# Patient Record
Sex: MALE | Race: WHITE | NOT HISPANIC OR LATINO | Employment: UNEMPLOYED | URBAN - METROPOLITAN AREA
[De-identification: names, ages, dates, MRNs, and addresses within clinical notes are randomized per-mention and may not be internally consistent; named-entity substitution may affect disease eponyms.]

---

## 2023-11-11 PROBLEM — R63.5 ABNORMAL WEIGHT GAIN: Status: ACTIVE | Noted: 2023-11-11

## 2023-11-11 PROBLEM — H71.93 CHOLESTEATOMA OF BOTH EARS: Status: ACTIVE | Noted: 2023-11-11

## 2023-11-11 PROBLEM — L83 ACANTHOSIS NIGRICANS: Status: ACTIVE | Noted: 2023-11-11

## 2023-11-11 PROBLEM — E66.9 OBESITY, UNSPECIFIED: Status: ACTIVE | Noted: 2023-11-11

## 2023-11-11 PROBLEM — Z97.3 WEARS GLASSES: Status: ACTIVE | Noted: 2023-11-11

## 2023-11-11 PROBLEM — H90.0 CONDUCTIVE HEARING LOSS, BILATERAL: Status: ACTIVE | Noted: 2023-11-11

## 2023-11-11 PROBLEM — H72.91 PERFORATION OF RIGHT TYMPANIC MEMBRANE: Status: ACTIVE | Noted: 2023-11-11

## 2023-11-11 PROBLEM — S50.02XA LEFT ELBOW CONTUSION: Status: ACTIVE | Noted: 2023-11-11

## 2023-11-11 PROBLEM — H74.21 DISCONTINUITY OF OSSICLES OF RIGHT EAR: Status: ACTIVE | Noted: 2023-11-11
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
## 2023-11-11 PROBLEM — H90.6 MIXED HEARING LOSS, BILATERAL: Status: ACTIVE | Noted: 2023-11-11

## 2023-11-11 PROBLEM — R06.83 SNORING: Status: ACTIVE | Noted: 2023-11-11

## 2023-11-11 PROBLEM — E78.5 DYSLIPIDEMIA: Status: ACTIVE | Noted: 2023-11-11

## 2023-11-11 PROBLEM — E16.1 HYPERINSULINISM: Status: ACTIVE | Noted: 2023-11-11

## 2023-11-11 RX ORDER — NAPROXEN 500 MG/1
1 TABLET ORAL DAILY
COMMUNITY
Start: 2019-02-02 | End: 2023-12-04 | Stop reason: ALTCHOICE

## 2023-11-13 ENCOUNTER — OFFICE VISIT (OUTPATIENT)
Dept: OTOLARYNGOLOGY | Facility: CLINIC | Age: 23
End: 2023-11-13
Payer: COMMERCIAL

## 2023-11-13 VITALS — HEIGHT: 77 IN | WEIGHT: 315 LBS | TEMPERATURE: 96.9 F | BODY MASS INDEX: 37.19 KG/M2

## 2023-11-13 DIAGNOSIS — H90.0 CONDUCTIVE HEARING LOSS, BILATERAL: ICD-10-CM

## 2023-11-13 DIAGNOSIS — H61.22 IMPACTED CERUMEN OF LEFT EAR: ICD-10-CM

## 2023-11-13 DIAGNOSIS — H71.93 CHOLESTEATOMA OF BOTH EARS: Primary | ICD-10-CM

## 2023-11-13 DIAGNOSIS — H95.191 ENCOUNTER FOR DEBRIDEMENT OF RIGHT POSTMASTOIDECTOMY CAVITY: ICD-10-CM

## 2023-11-13 DIAGNOSIS — H90.6 MIXED HEARING LOSS, BILATERAL: ICD-10-CM

## 2023-11-13 DIAGNOSIS — H74.21 DISCONTINUITY OF OSSICLES OF RIGHT EAR: ICD-10-CM

## 2023-11-13 PROBLEM — H72.91 PERFORATION OF RIGHT TYMPANIC MEMBRANE: Status: RESOLVED | Noted: 2023-11-11 | Resolved: 2023-11-13

## 2023-11-13 PROCEDURE — 69220 CLEAN OUT MASTOID CAVITY: CPT | Performed by: OTOLARYNGOLOGY

## 2023-11-13 PROCEDURE — 3008F BODY MASS INDEX DOCD: CPT | Performed by: OTOLARYNGOLOGY

## 2023-11-13 PROCEDURE — 99213 OFFICE O/P EST LOW 20 MIN: CPT | Performed by: OTOLARYNGOLOGY

## 2023-11-13 PROCEDURE — 69210 REMOVE IMPACTED EAR WAX UNI: CPT | Performed by: OTOLARYNGOLOGY

## 2023-11-13 PROCEDURE — 1036F TOBACCO NON-USER: CPT | Performed by: OTOLARYNGOLOGY

## 2023-11-13 ASSESSMENT — PATIENT HEALTH QUESTIONNAIRE - PHQ9
2. FEELING DOWN, DEPRESSED OR HOPELESS: NOT AT ALL
1. LITTLE INTEREST OR PLEASURE IN DOING THINGS: NOT AT ALL
SUM OF ALL RESPONSES TO PHQ9 QUESTIONS 1 AND 2: 0

## 2023-11-13 NOTE — PROGRESS NOTES
Reason for Consult:  Follow-up     Subjective   History Of Present Illness:  Ashkan Collado is a 23 y.o. male with bilateral cholesteatomas. They were both very extensive. On the left side, he had an attic cholesteatoma extending to the mastoid and extending laterally to the cortical bone of the mastoid. The incus was eroded and the stapes was intact. He is s/p left-sided tympanomastoidectomy with removal of cholesteatoma cartilage graft on 02/2023. We did not reconstruct the ossicular chain at that time.     I took him to the OR on 03/2023 and performed a Right modified radical tympanomastoidectomy with meatoplasty and removal of the tip of the mastoid due to erosion of the posterior ear canal     During surgery, we encountered:  1. Large attic cholesteatoma arising from the posterior epitympanum, extending to the anterior epitympanum, mesotympanum, retrotympanum and mastoid. There was complete attic and tensor fold blockage.   2. The incus and staples superstructure was completely eroded and the head of the malleus was surrounded by cholesteatoma. The malleus head were removed. The staples footplate remained present and mobile.   3. No evidence of cholesteatoma in the protympanum or hypotympanum.   4. Tensor fold obstructed.   5. Attic, Anterior and posterior Isthmus obstructed.   6. All the ventilation system was opened.   7. Chorda surrounded in cholesteatoma, sacrificed.   8. Mucosa moderately inflamed.   9. Mastoid, canal wall down performed   10. facial nerve dehiscent at the tympanic segment, but not in the mastoid cavity   11. partial erosion of the lateral canal with no matrix exposure     Since surgery, he has been doing well without any problems.      Past Medical History:  He has a past medical history of Concussion without loss of consciousness, initial encounter (09/28/2015), Obstructive sleep apnea (adult) (pediatric) (01/28/2017), and Personal history of other diseases of the respiratory  "system (11/06/2020).    Surgical History:  He has a past surgical history that includes Tonsillectomy (02/02/2015) and Other surgical history (01/28/2017).     Social History:  He reports that he has quit smoking. His smoking use included pipe. He has never used smokeless tobacco. No history on file for alcohol use and drug use.    Family History:  family history includes Diabetes in an other family member; Hyperlipidemia in his mother's brother and another family member; Obesity in his father, mother's brother, and another family member; Sleep apnea in his father and another family member.     Medications:  Current Outpatient Medications   Medication Instructions    naproxen (Naprosyn) 500 mg tablet 1 tablet, oral, Daily      Allergies:  Patient has no known allergies.    Review of Systems:   A comprehensive 10-point review of systems was obtained including constitutional, neurological, HEENT, pulmonary, cardiovascular, genito-urinary, and other pertinent systems and was negative except as noted in the HPI.     Objective   Physical Exam:  Last Recorded Vitals: Temperature 36.1 °C (96.9 °F), height 1.956 m (6' 5\"), weight (!) 161 kg (354 lb 14.4 oz).    On physical exam, the patient is a well-nourished, well-developed patient, in no acute distress, able to communicate without assistance in English language. Head and face is atraumatic and normocephalic. Salivary glands are intact. Facial strength is symmetrical bilaterally.       On ear examination:  Right ear: The canal wall down cavity looks intact and in good condition. The canal wall down cavity was debrided. The neotympanum is intact and in good condition. There is a mucoid effusion.   BC>AC  Left ear: The patient has cerumen impaction that was removed. The neotympanum is intact and in good condition.  BC>AC  The Saez is midline    On vestibular exam, the patient has no spontaneous nystagmus, no headshake nystagmus, no head-thrust nystagmus, and no nystagmus " on hyperventilation or Valsalva maneuvers. Chris-Hallpike maneuver is negative bilaterally.       On neuro exam, the patient is alert and oriented x3, cranial nerves are grossly intact, cerebellar exam is normal.      The rest of the exam, including anterior rhinoscopy, oropharyngeal exam, neck exam, and cardiovascular exam, were normal including no palpable lymphadenopathies, thyroid in the midline position, normal pulses, and normal chest excursion.       Reviewed Results:  Audiology Testing:  I reviewed his audiogram from 08/2023 that showed a mild left-sided conductive hearing loss and a moderate maximum conductive hearing loss on the right side. He has a 100% discrimination bilaterally.     I reviewed his audiogram from 01/2023 that showed a moderate conductive hearing loss on the left side and moderate to severe conductive hearing loss on the right side. He has 100% discrimination bilaterally.       Imaging:  I reviewed his CT scan from 12/2022 that showed a right-sided cholesteatoma with an erosion of the ear canal, absent incus and stapes. The lateral canal is intact. He has a very anterior sigmoid and a low line tegmen on the right side. On the left side, he has an epitympanic cholesteatoma with erosion of the incus and debris in the mastoid and attic. The cortex is eroded. The stapes seems to be present.      Assessment/Plan   In summary, Ashkan Collado is a 23 y.o. male with bilateral cholesteatomas. They were both very extensive. On the left side, he had an attic cholesteatoma extending to the mastoid and extending laterally to the cortical bone of the mastoid. The incus was eroded and the stapes was intact. He is s/p left-sided tympanomastoidectomy and removal of the cholesteatoma in 02/2023. I did not reconstruct the ossicular chain and the neotympanum is healing appropriately.     On the right side, he had had a extensive cholesteatoma with erosion of the posterior wall of the ear canal as well as a  very low tegmen with anterior sigmoid that required a canal wall down mastoidectomy done in 02/2023.  I did not reconstruct the ossicular chain either. The cavity has epithelized appropriately and it looks to be in perfect condition.    On the left side, the cerumen impaction was removed. There is a little area of exposed bone, but overall the neotympanum looks in good condition.     He is interested in having surgery in the left ear for a second look. The risks, indications, alternatives and complications of doing left-sided endoscopic tympanoplasty with cartilage graft and possible OCR were discussed with the patient. The patient elected to proceed. He is already scheduled for December. I will see him the day of surgery.       Scribe Attestation  By signing my name below, I, Thong Nick   attest that this documentation has been prepared under the direction and in the presence of Yusuf Salgado MD.   ____________________________________________________  Yusuf Candelaria MD  Professor and Chief   Otology/Neurotology/Lateral Skull-Base Surgery   [facility]

## 2023-11-28 ENCOUNTER — TELEMEDICINE CLINICAL SUPPORT (OUTPATIENT)
Dept: PREADMISSION TESTING | Facility: HOSPITAL | Age: 23
End: 2023-11-28
Payer: COMMERCIAL

## 2023-12-04 ENCOUNTER — PRE-ADMISSION TESTING (OUTPATIENT)
Dept: PREADMISSION TESTING | Facility: HOSPITAL | Age: 23
End: 2023-12-04
Payer: COMMERCIAL

## 2023-12-04 VITALS
TEMPERATURE: 98.1 F | HEIGHT: 77 IN | DIASTOLIC BLOOD PRESSURE: 86 MMHG | SYSTOLIC BLOOD PRESSURE: 136 MMHG | HEART RATE: 70 BPM | OXYGEN SATURATION: 98 % | WEIGHT: 315 LBS | BODY MASS INDEX: 37.19 KG/M2

## 2023-12-04 DIAGNOSIS — Z01.818 PREOPERATIVE TESTING: Primary | ICD-10-CM

## 2023-12-04 LAB
ANION GAP SERPL CALC-SCNC: 15 MMOL/L (ref 10–20)
BUN SERPL-MCNC: 10 MG/DL (ref 6–23)
CALCIUM SERPL-MCNC: 9.4 MG/DL (ref 8.6–10.6)
CHLORIDE SERPL-SCNC: 101 MMOL/L (ref 98–107)
CO2 SERPL-SCNC: 27 MMOL/L (ref 21–32)
CREAT SERPL-MCNC: 0.75 MG/DL (ref 0.5–1.3)
ERYTHROCYTE [DISTWIDTH] IN BLOOD BY AUTOMATED COUNT: 11.9 % (ref 11.5–14.5)
GFR SERPL CREATININE-BSD FRML MDRD: >90 ML/MIN/1.73M*2
GLUCOSE SERPL-MCNC: 99 MG/DL (ref 74–99)
HCT VFR BLD AUTO: 47 % (ref 41–52)
HGB BLD-MCNC: 15.6 G/DL (ref 13.5–17.5)
MCH RBC QN AUTO: 29.4 PG (ref 26–34)
MCHC RBC AUTO-ENTMCNC: 33.2 G/DL (ref 32–36)
MCV RBC AUTO: 89 FL (ref 80–100)
NRBC BLD-RTO: 0 /100 WBCS (ref 0–0)
PLATELET # BLD AUTO: 268 X10*3/UL (ref 150–450)
POTASSIUM SERPL-SCNC: 3.9 MMOL/L (ref 3.5–5.3)
RBC # BLD AUTO: 5.31 X10*6/UL (ref 4.5–5.9)
SODIUM SERPL-SCNC: 139 MMOL/L (ref 136–145)
WBC # BLD AUTO: 8.9 X10*3/UL (ref 4.4–11.3)

## 2023-12-04 PROCEDURE — 36415 COLL VENOUS BLD VENIPUNCTURE: CPT

## 2023-12-04 PROCEDURE — 80048 BASIC METABOLIC PNL TOTAL CA: CPT

## 2023-12-04 PROCEDURE — 99214 OFFICE O/P EST MOD 30 MIN: CPT | Performed by: NURSE PRACTITIONER

## 2023-12-04 PROCEDURE — 85027 COMPLETE CBC AUTOMATED: CPT

## 2023-12-04 ASSESSMENT — ENCOUNTER SYMPTOMS
NECK NEGATIVE: 1
RESPIRATORY NEGATIVE: 1
CARDIOVASCULAR NEGATIVE: 1
CONSTITUTIONAL NEGATIVE: 1
ENDOCRINE NEGATIVE: 1
NEUROLOGICAL NEGATIVE: 1
EYES NEGATIVE: 1
MUSCULOSKELETAL NEGATIVE: 1
GASTROINTESTINAL NEGATIVE: 1

## 2023-12-04 ASSESSMENT — CHADS2 SCORE
CHF: NO
AGE GREATER THAN OR EQUAL TO 75: NO
DIABETES: NO
CHADS2 SCORE: 0
HYPERTENSION: NO
PRIOR STROKE OR TIA OR THROMBOEMBOLISM: NO

## 2023-12-04 ASSESSMENT — DUKE ACTIVITY SCORE INDEX (DASI)
DASI METS SCORE: 9.9
CAN YOU WALK INDOORS, SUCH AS AROUND YOUR HOUSE: YES
TOTAL_SCORE: 58.2
CAN YOU CLIMB A FLIGHT OF STAIRS OR WALK UP A HILL: YES
CAN YOU TAKE CARE OF YOURSELF (EAT, DRESS, BATHE, OR USE TOILET): YES
CAN YOU DO HEAVY WORK AROUND THE HOUSE LIKE SCRUBBING FLOORS OR LIFTING AND MOVING HEAVY FURNITURE: YES
CAN YOU PARTICIPATE IN MODERATE RECREATIONAL ACTIVITIES LIKE GOLF, BOWLING, DANCING, DOUBLES TENNIS OR THROWING A BASEBALL OR FOOTBALL: YES
CAN YOU HAVE SEXUAL RELATIONS: YES
CAN YOU DO MODERATE WORK AROUND THE HOUSE LIKE VACUUMING, SWEEPING FLOORS OR CARRYING GROCERIES: YES
CAN YOU RUN A SHORT DISTANCE: YES
CAN YOU DO LIGHT WORK AROUND THE HOUSE LIKE DUSTING OR WASHING DISHES: YES
CAN YOU DO YARD WORK LIKE RAKING LEAVES, WEEDING OR PUSHING A MOWER: YES
CAN YOU WALK A BLOCK OR TWO ON LEVEL GROUND: YES
CAN YOU PARTICIPATE IN STRENOUS SPORTS LIKE SWIMMING, SINGLES TENNIS, FOOTBALL, BASKETBALL, OR SKIING: YES

## 2023-12-04 ASSESSMENT — LIFESTYLE VARIABLES: SMOKING_STATUS: SMOKER

## 2023-12-04 NOTE — PREPROCEDURE INSTRUCTIONS
NPO Instructions:    Do not eat any food after midnight the night before your surgery/procedure.  You may have up to TEN ounces of clear liquids until TWO hours before your instructed arrival time to the hospital. This includes water, black tea/coffee, (no milk or cream), apple juice, and/or electrolyte drinks (Gatorade).  You may chew gum up to TWO hours before your surgery/procedure.    Additional Instructions:     Avoid herbal supplements, multivitamins and NSAIDS (non-steroidal anti-inflammatory drugs) such as Advil, Aleve, Ibuprofen, Naproxen, Excedrin, Meloxicam or Celebrex for at least 7 days prior to surgery. May take Tylenol as needed.    Seven/Six Days before Surgery:  Review your medication instructions, stop indicated medications    Day of Surgery:  Review your medication instructions, take indicated medications  Wear comfortable loose fitting clothing  Do not use moisturizers, creams, lotions or perfume  All jewelry and valuables should be left at home    [facility] Corrales [facility]  [facility]  [phone]  [phone]  [email or web address]

## 2023-12-04 NOTE — CPM/PAT H&P
CPM/PAT Evaluation       Name: Ashkan Collado (Ashkan Collado)  /Age:  y.o.     Visit Type:   In-Person       Chief Complaint: cholesteatoma, hearing loss    HPI  The patient is a 23 year old  male with history of bilateral cholesteatomas. He is s/p left-sided tympanomastoidectomy with removal of cholesteatoma cartilage graft on 2023 and right modified radical tympanomastoidectomy with meatoplasty and removal of the tip of the mastoid due to erosion of the posterior ear canal 3/2023. He presents today for perioperative evaluation in anticipation of left sided endoscopic tympanoplasty antrotomy, OCR, and cartilage graft on 23 with Dr. Bari Salgado.       Past Medical History:   Diagnosis Date    ADHD (attention deficit hyperactivity disorder)     Anxiety     HL (hearing loss)     ENT: Yusuf Salgado    Obstructive sleep apnea (adult) (pediatric) 2017    Obstructive sleep apnea    Perforation of right tympanic membrane 2023       Past Surgical History:   Procedure Laterality Date    OTHER SURGICAL HISTORY  2017    Cholesteatoma Surgery    TONSILLECTOMY  2015    Tonsillectomy With Adenoidectomy    TYMPANOPLASTY  2023    With mastoidectomy       Patient Sexual activity questions deferred to the physician.    Family History   Problem Relation Name Age of Onset    Obesity Father      Sleep apnea Father      Hyperlipidemia Mother's Brother      Obesity Mother's Brother      Sleep apnea Other family Hx     Obesity Other family Hx     Hyperlipidemia Other family Hx     Diabetes Other family Hx        No Known Allergies    Prior to Admission medications    Medication Sig Start Date End Date Taking? Authorizing Provider   naproxen (Naprosyn) 500 mg tablet Take 1 tablet (500 mg) by mouth early in the morning.. 19  Historical Provider, MD VILLAGRAN ROS:   Constitutional:   neg    Neuro/Psych:   neg    Eyes:   neg    Ears:    hearing loss  Nose:    neg    Mouth:   neg    Throat:   neg    Neck:   neg    Cardio:   neg    Respiratory:   neg    Endocrine:   neg    GI:   neg    :   neg    Musculoskeletal:   neg    Hematologic:   neg    Skin:  neg        Physical Exam  Vitals reviewed.   Constitutional:       Appearance: Normal appearance.   HENT:      Head: Normocephalic and atraumatic.      Nose: Nose normal.      Mouth/Throat:      Mouth: Mucous membranes are moist.      Pharynx: Oropharynx is clear.   Eyes:      Extraocular Movements: Extraocular movements intact.      Pupils: Pupils are equal, round, and reactive to light.   Cardiovascular:      Rate and Rhythm: Normal rate and regular rhythm.      Pulses: Normal pulses.      Heart sounds: Normal heart sounds.   Pulmonary:      Effort: Pulmonary effort is normal.      Breath sounds: Normal breath sounds.   Musculoskeletal:         General: Normal range of motion.      Cervical back: Normal range of motion.   Skin:     General: Skin is warm and dry.   Neurological:      General: No focal deficit present.      Mental Status: He is alert and oriented to person, place, and time.   Psychiatric:         Mood and Affect: Mood normal.         Behavior: Behavior normal.          PAT AIRWAY:   Airway:     Mallampati::  II    TM distance::  >3 FB    Neck ROM::  Full  normal        Visit Vitals  /86   Pulse 70   Temp 36.7 °C (98.1 °F) (Oral)       DASI Risk Score      Flowsheet Row Most Recent Value   DASI SCORE 58.2   METS Score (Will be calculated only when all the questions are answered) 9.9          Caprini DVT Assessment      Flowsheet Row Most Recent Value   DVT Score 7   Current Status Major surgery planned, lasting 2-3 hours   History Prior major surgery   Age Less than 40 years   BMI 41-50 (Morbid obesity)          Modified Frailty Index      Flowsheet Row Most Recent Value   Modified Frailty Index Calculator 0          CHADS2 Stroke Risk         N/A 3 - 100%: High Risk   2 - 3%: Medium Risk   0 - 2%:  Low Risk     Last Change: N/A          This score determines the patient's risk of having a stroke if the patient has atrial fibrillation.        This score is not applicable to this patient. Components are not calculated.          Revised Cardiac Risk Index      Flowsheet Row Most Recent Value   Revised Cardiac Risk Calculator 0          Apfel Simplified Score      Flowsheet Row Most Recent Value   Apfel Simplified Score Calculator 1          Risk Analysis Index Results This Encounter    No data found in the last 1 encounters.       Stop Bang Score      Flowsheet Row Most Recent Value   Do you snore loudly? 1   Do you often feel tired or fatigued after your sleep? 0   Has anyone ever observed you stop breathing in your sleep? 0   Do you have or are you being treated for high blood pressure? 0   Recent BMI (Calculated) 42.1   Is BMI greater than 35 kg/m2? 1=Yes   Age older than 50 years old? 0=No   Is your neck circumference greater than 17 inches (Male) or 16 inches (Female)? 1   Gender - Male 1=Yes   STOP-BANG Total Score 4          Recent Results (from the past 336 hour(s))   CBC    Collection Time: 12/04/23  2:11 PM   Result Value Ref Range    WBC 8.9 4.4 - 11.3 x10*3/uL    nRBC 0.0 0.0 - 0.0 /100 WBCs    RBC 5.31 4.50 - 5.90 x10*6/uL    Hemoglobin 15.6 13.5 - 17.5 g/dL    Hematocrit 47.0 41.0 - 52.0 %    MCV 89 80 - 100 fL    MCH 29.4 26.0 - 34.0 pg    MCHC 33.2 32.0 - 36.0 g/dL    RDW 11.9 11.5 - 14.5 %    Platelets 268 150 - 450 x10*3/uL   Basic Metabolic Panel    Collection Time: 12/04/23  2:11 PM   Result Value Ref Range    Glucose 99 74 - 99 mg/dL    Sodium 139 136 - 145 mmol/L    Potassium 3.9 3.5 - 5.3 mmol/L    Chloride 101 98 - 107 mmol/L    Bicarbonate 27 21 - 32 mmol/L    Anion Gap 15 10 - 20 mmol/L    Urea Nitrogen 10 6 - 23 mg/dL    Creatinine 0.75 0.50 - 1.30 mg/dL    eGFR >90 >60 mL/min/1.73m*2    Calcium 9.4 8.6 - 10.6 mg/dL          Assessment and Plan:     Neuro:   The patient has no  neurological diagnoses or significant findings on chart review, clinical presentation, and evaluation.  No grossly apparent perioperative risk. The patient is at increased risk for perioperative stroke secondary to general anesthesia, operative time >2.5 hours. Handouts for preoperative brain exercises given to patient.    HEENT/Airway  The patient has diagnoses, significant findings on chart review, clinical presentation or evaluation of obesity, short thick neck, No documented or reported history of airway difficulty.     Cardiovascular  The patient is scheduled for non-cardiac surgery associated with elevated risk.  The patient has no major cardiac contraindications to non- cardiac surgery.  RCRI  The patient meets 0-1 RCRI criteria and therefore has a less than 1% risk of major adverse cardiac complications.  METS  The patient's functional capacity capacity is greater than 4 METS.  EKG  The patient has no EKG or echocardiographic changes concerning for myocardial ischemia.  No further cardiac evaluation is indicated  Heart Failure  The patient has no known history of heart failure.  Additionally, the patient reports no symptoms of heart failure and demonstrates no signs of heart failure.  Hypertension Evaluation  The patient has no known history of hypertension and has a normal blood pressure today.  Heart Rhythm Evaluation  The patient has no history of arrhythmias.  Heart Valve Evaluation  The patient has no known history of valvular heart disease. The patient has no symptoms or physical exam findings to suggest valvular heart disease.  CARDS EVAL  The patient is not followed by cardiology.  The patient has a 30-day risk for MACE of 0 predictors, 3.9% risk for cardiac death, nonfatal myocardial infarction, and nonfatal cardiac arrest.  MAR score which indicates a 0% risk of intraoperative or 30-day postoperative.    Pulmonary   No significant findings on chart review or clinical presentation and evaluation.  The patient is at increased risk of perioperative pulmonary complications secondary to morbid obesity.  The patient has a stop bang score of 4, which places patient at intermediate risk for having SURESH.  ARISCAT 16, low, 1.6% risk of in-hospital postoperative pulmonary complications  PRODIGY 11, intermediate of respiratory depression episode.    Hematology  No diagnoses or significant findings on chart review or clinical presentation and evaluation.  Antiplatelet management   The patient is not currently receiving antiplatelet therapy.  Anticoagulation management  The patient is not currently receiving anticoagulation therapy. Patient provided with DVT educational handout.    Caprini score 7, high risk of perioperative VTE    GI  No diagnoses or significant findings on chart review or clinical presentation and evaluation.  Eat 10- 0,  self-perceived oropharyngeal dysphagia scale (0-40)     Genitourinary  No diagnoses or significant findings on chart review or clinical presentation and evaluation.    Renal  The patient has no known history of chronic kidney disease. No renal diagnoses or significant findings on chart review or clinical presentation and evaluation. The patient has specific risk factors associated with increased risk of perioperative renal complications due to male gender. Preventative measures include preoperative hydration.    Musculoskeletal  No diagnoses or significant findings on chart review or clinical presentation and evaluation.    Endocrine  Diabetes Evaluation  The patient has no history of diabetes mellitus  Thyroid Disease Evaluation  The patient has no history of thyroid disease.    ID  No diagnoses or significant findings on chart review or clinical presentation and evaluation.    -Preoperative medication instructions were provided and reviewed with the patient.  Any additional testing or evaluation was explained to the patient.  NPO Instructions were discussed, and the patient's  questions were answered prior to conclusion of this encounter

## 2023-12-16 ENCOUNTER — ANESTHESIA EVENT (OUTPATIENT)
Dept: OPERATING ROOM | Facility: HOSPITAL | Age: 23
End: 2023-12-16
Payer: COMMERCIAL

## 2023-12-18 ENCOUNTER — ANESTHESIA (OUTPATIENT)
Dept: OPERATING ROOM | Facility: HOSPITAL | Age: 23
End: 2023-12-18
Payer: COMMERCIAL

## 2023-12-18 ENCOUNTER — HOSPITAL ENCOUNTER (OUTPATIENT)
Facility: HOSPITAL | Age: 23
Setting detail: OUTPATIENT SURGERY
Discharge: HOME | End: 2023-12-18
Attending: OTOLARYNGOLOGY | Admitting: OTOLARYNGOLOGY
Payer: COMMERCIAL

## 2023-12-18 VITALS
DIASTOLIC BLOOD PRESSURE: 74 MMHG | OXYGEN SATURATION: 97 % | RESPIRATION RATE: 18 BRPM | WEIGHT: 315 LBS | TEMPERATURE: 98.1 F | HEIGHT: 76 IN | HEART RATE: 73 BPM | SYSTOLIC BLOOD PRESSURE: 121 MMHG | BODY MASS INDEX: 38.36 KG/M2

## 2023-12-18 DIAGNOSIS — G89.18 POST-OPERATIVE PAIN: ICD-10-CM

## 2023-12-18 DIAGNOSIS — H90.0 CONDUCTIVE HEARING LOSS, BILATERAL: ICD-10-CM

## 2023-12-18 DIAGNOSIS — H71.93 CHOLESTEATOMA OF BOTH EARS: Primary | ICD-10-CM

## 2023-12-18 PROBLEM — G47.33 OSA (OBSTRUCTIVE SLEEP APNEA): Status: ACTIVE | Noted: 2023-12-18

## 2023-12-18 PROCEDURE — 2500000005 HC RX 250 GENERAL PHARMACY W/O HCPCS

## 2023-12-18 PROCEDURE — 3700000001 HC GENERAL ANESTHESIA TIME - INITIAL BASE CHARGE: Performed by: OTOLARYNGOLOGY

## 2023-12-18 PROCEDURE — 2780000003 HC OR 278 NO HCPCS: Performed by: OTOLARYNGOLOGY

## 2023-12-18 PROCEDURE — 3600000007 HC OR TIME - EACH INCREMENTAL 1 MINUTE - PROCEDURE LEVEL TWO: Performed by: OTOLARYNGOLOGY

## 2023-12-18 PROCEDURE — 96372 THER/PROPH/DIAG INJ SC/IM: CPT | Performed by: OTOLARYNGOLOGY

## 2023-12-18 PROCEDURE — 7100000010 HC PHASE TWO TIME - EACH INCREMENTAL 1 MINUTE: Performed by: OTOLARYNGOLOGY

## 2023-12-18 PROCEDURE — 2500000002 HC RX 250 W HCPCS SELF ADMINISTERED DRUGS (ALT 637 FOR MEDICARE OP, ALT 636 FOR OP/ED)

## 2023-12-18 PROCEDURE — 2720000007 HC OR 272 NO HCPCS: Performed by: OTOLARYNGOLOGY

## 2023-12-18 PROCEDURE — 3600000002 HC OR TIME - INITIAL BASE CHARGE - PROCEDURE LEVEL TWO: Performed by: OTOLARYNGOLOGY

## 2023-12-18 PROCEDURE — 7100000001 HC RECOVERY ROOM TIME - INITIAL BASE CHARGE: Performed by: OTOLARYNGOLOGY

## 2023-12-18 PROCEDURE — 2500000001 HC RX 250 WO HCPCS SELF ADMINISTERED DRUGS (ALT 637 FOR MEDICARE OP): Performed by: OTOLARYNGOLOGY

## 2023-12-18 PROCEDURE — 3700000002 HC GENERAL ANESTHESIA TIME - EACH INCREMENTAL 1 MINUTE: Performed by: OTOLARYNGOLOGY

## 2023-12-18 PROCEDURE — A69633 PR TYMPANOPLASTY,REBLD OSSIC CHAIN+PROS: Performed by: ANESTHESIOLOGY

## 2023-12-18 PROCEDURE — 95867 NDL EMG CRANIAL NRV MUSC UNI: CPT | Performed by: OTOLARYNGOLOGY

## 2023-12-18 PROCEDURE — 2500000004 HC RX 250 GENERAL PHARMACY W/ HCPCS (ALT 636 FOR OP/ED): Performed by: ANESTHESIOLOGY

## 2023-12-18 PROCEDURE — 36620 INSERTION CATHETER ARTERY: CPT

## 2023-12-18 PROCEDURE — 7100000002 HC RECOVERY ROOM TIME - EACH INCREMENTAL 1 MINUTE: Performed by: OTOLARYNGOLOGY

## 2023-12-18 PROCEDURE — 7100000009 HC PHASE TWO TIME - INITIAL BASE CHARGE: Performed by: OTOLARYNGOLOGY

## 2023-12-18 PROCEDURE — 2500000005 HC RX 250 GENERAL PHARMACY W/O HCPCS: Performed by: OTOLARYNGOLOGY

## 2023-12-18 PROCEDURE — 2500000004 HC RX 250 GENERAL PHARMACY W/ HCPCS (ALT 636 FOR OP/ED)

## 2023-12-18 PROCEDURE — 69633 REBUILD EARDRUM STRUCTURES: CPT | Performed by: OTOLARYNGOLOGY

## 2023-12-18 PROCEDURE — 2500000002 HC RX 250 W HCPCS SELF ADMINISTERED DRUGS (ALT 637 FOR MEDICARE OP, ALT 636 FOR OP/ED): Performed by: OTOLARYNGOLOGY

## 2023-12-18 PROCEDURE — A4217 STERILE WATER/SALINE, 500 ML: HCPCS | Performed by: OTOLARYNGOLOGY

## 2023-12-18 PROCEDURE — 2500000004 HC RX 250 GENERAL PHARMACY W/ HCPCS (ALT 636 FOR OP/ED): Performed by: OTOLARYNGOLOGY

## 2023-12-18 DEVICE — CENTERED ALTO CONCISE PARTIAL SIZERS INCLUDED TITANIUM/SILICONE
Type: IMPLANTABLE DEVICE | Site: EAR | Status: FUNCTIONAL
Brand: CENTERED ALTO CONCISE PARTIAL

## 2023-12-18 RX ORDER — LIDOCAINE HYDROCHLORIDE AND EPINEPHRINE 10; 10 MG/ML; UG/ML
INJECTION, SOLUTION INFILTRATION; PERINEURAL AS NEEDED
Status: DISCONTINUED | OUTPATIENT
Start: 2023-12-18 | End: 2023-12-18 | Stop reason: HOSPADM

## 2023-12-18 RX ORDER — CEPHALEXIN 500 MG/1
500 CAPSULE ORAL 3 TIMES DAILY
Qty: 21 CAPSULE | Refills: 0 | Status: SHIPPED | OUTPATIENT
Start: 2023-12-18 | End: 2023-12-25

## 2023-12-18 RX ORDER — EPINEPHRINE 1 MG/ML
INJECTION, SOLUTION, CONCENTRATE INTRAVENOUS AS NEEDED
Status: DISCONTINUED | OUTPATIENT
Start: 2023-12-18 | End: 2023-12-18 | Stop reason: HOSPADM

## 2023-12-18 RX ORDER — ACETAMINOPHEN 325 MG/1
650 TABLET ORAL EVERY 4 HOURS PRN
Status: DISCONTINUED | OUTPATIENT
Start: 2023-12-18 | End: 2023-12-18 | Stop reason: HOSPADM

## 2023-12-18 RX ORDER — REMIFENTANIL HYDROCHLORIDE 1 MG/ML
INJECTION, POWDER, LYOPHILIZED, FOR SOLUTION INTRAVENOUS AS NEEDED
Status: DISCONTINUED | OUTPATIENT
Start: 2023-12-18 | End: 2023-12-18

## 2023-12-18 RX ORDER — CIPROFLOXACIN AND DEXAMETHASONE 3; 1 MG/ML; MG/ML
SUSPENSION/ DROPS AURICULAR (OTIC) AS NEEDED
Status: DISCONTINUED | OUTPATIENT
Start: 2023-12-18 | End: 2023-12-18 | Stop reason: HOSPADM

## 2023-12-18 RX ORDER — ROCURONIUM BROMIDE 10 MG/ML
INJECTION, SOLUTION INTRAVENOUS AS NEEDED
Status: DISCONTINUED | OUTPATIENT
Start: 2023-12-18 | End: 2023-12-18

## 2023-12-18 RX ORDER — WATER 1 ML/ML
IRRIGANT IRRIGATION AS NEEDED
Status: DISCONTINUED | OUTPATIENT
Start: 2023-12-18 | End: 2023-12-18 | Stop reason: HOSPADM

## 2023-12-18 RX ORDER — MIDAZOLAM HYDROCHLORIDE 1 MG/ML
INJECTION, SOLUTION INTRAMUSCULAR; INTRAVENOUS AS NEEDED
Status: DISCONTINUED | OUTPATIENT
Start: 2023-12-18 | End: 2023-12-18

## 2023-12-18 RX ORDER — LIDOCAINE HYDROCHLORIDE 20 MG/ML
INJECTION, SOLUTION INFILTRATION; PERINEURAL AS NEEDED
Status: DISCONTINUED | OUTPATIENT
Start: 2023-12-18 | End: 2023-12-18

## 2023-12-18 RX ORDER — DEXAMETHASONE SODIUM PHOSPHATE 100 MG/10ML
INJECTION INTRAMUSCULAR; INTRAVENOUS AS NEEDED
Status: DISCONTINUED | OUTPATIENT
Start: 2023-12-18 | End: 2023-12-18

## 2023-12-18 RX ORDER — ONDANSETRON HYDROCHLORIDE 2 MG/ML
INJECTION, SOLUTION INTRAVENOUS AS NEEDED
Status: DISCONTINUED | OUTPATIENT
Start: 2023-12-18 | End: 2023-12-18

## 2023-12-18 RX ORDER — MIDAZOLAM HYDROCHLORIDE 1 MG/ML
INJECTION INTRAMUSCULAR; INTRAVENOUS CONTINUOUS PRN
Status: DISCONTINUED | OUTPATIENT
Start: 2023-12-18 | End: 2023-12-18

## 2023-12-18 RX ORDER — ONDANSETRON HYDROCHLORIDE 2 MG/ML
4 INJECTION, SOLUTION INTRAVENOUS ONCE AS NEEDED
Status: DISCONTINUED | OUTPATIENT
Start: 2023-12-18 | End: 2023-12-18 | Stop reason: HOSPADM

## 2023-12-18 RX ORDER — CIPROFLOXACIN AND DEXAMETHASONE 3; 1 MG/ML; MG/ML
3 SUSPENSION/ DROPS AURICULAR (OTIC) 2 TIMES DAILY
Qty: 7.5 ML | Refills: 0 | Status: SHIPPED | OUTPATIENT
Start: 2023-12-18 | End: 2023-12-25

## 2023-12-18 RX ORDER — SCOLOPAMINE TRANSDERMAL SYSTEM 1 MG/1
PATCH, EXTENDED RELEASE TRANSDERMAL AS NEEDED
Status: DISCONTINUED | OUTPATIENT
Start: 2023-12-18 | End: 2023-12-18

## 2023-12-18 RX ORDER — NORETHINDRONE AND ETHINYL ESTRADIOL 0.5-0.035
KIT ORAL AS NEEDED
Status: DISCONTINUED | OUTPATIENT
Start: 2023-12-18 | End: 2023-12-18

## 2023-12-18 RX ORDER — POLYETHYLENE GLYCOL 3350 17 G/17G
17 POWDER, FOR SOLUTION ORAL DAILY
Qty: 5 PACKET | Refills: 0 | Status: SHIPPED | OUTPATIENT
Start: 2023-12-18 | End: 2023-12-23

## 2023-12-18 RX ORDER — GLYCOPYRROLATE 0.2 MG/ML
INJECTION INTRAMUSCULAR; INTRAVENOUS AS NEEDED
Status: DISCONTINUED | OUTPATIENT
Start: 2023-12-18 | End: 2023-12-18

## 2023-12-18 RX ORDER — ACETAMINOPHEN 325 MG/1
TABLET ORAL AS NEEDED
Status: DISCONTINUED | OUTPATIENT
Start: 2023-12-18 | End: 2023-12-18

## 2023-12-18 RX ORDER — SODIUM CHLORIDE, SODIUM LACTATE, POTASSIUM CHLORIDE, CALCIUM CHLORIDE 600; 310; 30; 20 MG/100ML; MG/100ML; MG/100ML; MG/100ML
INJECTION, SOLUTION INTRAVENOUS CONTINUOUS PRN
Status: DISCONTINUED | OUTPATIENT
Start: 2023-12-18 | End: 2023-12-18

## 2023-12-18 RX ORDER — HYDROMORPHONE HYDROCHLORIDE 1 MG/ML
INJECTION, SOLUTION INTRAMUSCULAR; INTRAVENOUS; SUBCUTANEOUS CONTINUOUS PRN
Status: DISCONTINUED | OUTPATIENT
Start: 2023-12-18 | End: 2023-12-18

## 2023-12-18 RX ORDER — HYDROMORPHONE HYDROCHLORIDE 1 MG/ML
0.2 INJECTION, SOLUTION INTRAMUSCULAR; INTRAVENOUS; SUBCUTANEOUS EVERY 5 MIN PRN
Status: DISCONTINUED | OUTPATIENT
Start: 2023-12-18 | End: 2023-12-18 | Stop reason: HOSPADM

## 2023-12-18 RX ORDER — OXYCODONE AND ACETAMINOPHEN 5; 325 MG/1; MG/1
1 TABLET ORAL EVERY 6 HOURS PRN
Qty: 8 TABLET | Refills: 0 | Status: SHIPPED | OUTPATIENT
Start: 2023-12-18

## 2023-12-18 RX ORDER — OXYCODONE HYDROCHLORIDE 5 MG/1
5 TABLET ORAL EVERY 4 HOURS PRN
Status: DISCONTINUED | OUTPATIENT
Start: 2023-12-18 | End: 2023-12-18 | Stop reason: HOSPADM

## 2023-12-18 RX ORDER — PHENYLEPHRINE 10 MG/250 ML(40 MCG/ML)IN 0.9 % SOD.CHLORIDE INTRAVENOUS
CONTINUOUS PRN
Status: DISCONTINUED | OUTPATIENT
Start: 2023-12-18 | End: 2023-12-18

## 2023-12-18 RX ORDER — CEFAZOLIN 1 G/1
INJECTION, POWDER, FOR SOLUTION INTRAVENOUS AS NEEDED
Status: DISCONTINUED | OUTPATIENT
Start: 2023-12-18 | End: 2023-12-18

## 2023-12-18 RX ORDER — MUPIROCIN 20 MG/G
OINTMENT TOPICAL AS NEEDED
Status: DISCONTINUED | OUTPATIENT
Start: 2023-12-18 | End: 2023-12-18 | Stop reason: HOSPADM

## 2023-12-18 RX ORDER — ESMOLOL HYDROCHLORIDE 10 MG/ML
INJECTION INTRAVENOUS AS NEEDED
Status: DISCONTINUED | OUTPATIENT
Start: 2023-12-18 | End: 2023-12-18

## 2023-12-18 RX ORDER — APREPITANT 40 MG/1
CAPSULE ORAL AS NEEDED
Status: DISCONTINUED | OUTPATIENT
Start: 2023-12-18 | End: 2023-12-18

## 2023-12-18 RX ORDER — HYDROMORPHONE HYDROCHLORIDE 1 MG/ML
0.5 INJECTION, SOLUTION INTRAMUSCULAR; INTRAVENOUS; SUBCUTANEOUS EVERY 5 MIN PRN
Status: DISCONTINUED | OUTPATIENT
Start: 2023-12-18 | End: 2023-12-18 | Stop reason: HOSPADM

## 2023-12-18 RX ORDER — SODIUM CHLORIDE, SODIUM LACTATE, POTASSIUM CHLORIDE, CALCIUM CHLORIDE 600; 310; 30; 20 MG/100ML; MG/100ML; MG/100ML; MG/100ML
50 INJECTION, SOLUTION INTRAVENOUS CONTINUOUS
Status: DISCONTINUED | OUTPATIENT
Start: 2023-12-18 | End: 2023-12-18 | Stop reason: HOSPADM

## 2023-12-18 RX ORDER — PROPOFOL 10 MG/ML
INJECTION, EMULSION INTRAVENOUS AS NEEDED
Status: DISCONTINUED | OUTPATIENT
Start: 2023-12-18 | End: 2023-12-18

## 2023-12-18 RX ORDER — SODIUM CHLORIDE 0.9 G/100ML
IRRIGANT IRRIGATION AS NEEDED
Status: DISCONTINUED | OUTPATIENT
Start: 2023-12-18 | End: 2023-12-18 | Stop reason: HOSPADM

## 2023-12-18 RX ADMIN — EPHEDRINE SULFATE 5 MG: 50 INJECTION, SOLUTION INTRAVENOUS at 08:20

## 2023-12-18 RX ADMIN — REMIFENTANIL HYDROCHLORIDE 0.15 MCG/KG/MIN: 1 INJECTION, POWDER, LYOPHILIZED, FOR SOLUTION INTRAVENOUS at 08:46

## 2023-12-18 RX ADMIN — REMIFENTANIL HYDROCHLORIDE 40 MCG: 1 INJECTION, POWDER, LYOPHILIZED, FOR SOLUTION INTRAVENOUS at 08:28

## 2023-12-18 RX ADMIN — REMIFENTANIL HYDROCHLORIDE 20 MCG: 1 INJECTION, POWDER, LYOPHILIZED, FOR SOLUTION INTRAVENOUS at 08:38

## 2023-12-18 RX ADMIN — REMIFENTANIL HYDROCHLORIDE 0.1 MCG/KG/MIN: 1 INJECTION, POWDER, LYOPHILIZED, FOR SOLUTION INTRAVENOUS at 08:21

## 2023-12-18 RX ADMIN — REMIFENTANIL HYDROCHLORIDE 40 MCG: 1 INJECTION, POWDER, LYOPHILIZED, FOR SOLUTION INTRAVENOUS at 08:47

## 2023-12-18 RX ADMIN — Medication 0.2 MCG/KG/MIN: at 08:03

## 2023-12-18 RX ADMIN — SCOPALAMINE 1 PATCH: 1 PATCH, EXTENDED RELEASE TRANSDERMAL at 06:52

## 2023-12-18 RX ADMIN — REMIFENTANIL HYDROCHLORIDE 40 MCG: 1 INJECTION, POWDER, LYOPHILIZED, FOR SOLUTION INTRAVENOUS at 09:25

## 2023-12-18 RX ADMIN — ACETAMINOPHEN 975 MG: 325 TABLET ORAL at 06:52

## 2023-12-18 RX ADMIN — REMIFENTANIL HYDROCHLORIDE 60 MCG: 1 INJECTION, POWDER, LYOPHILIZED, FOR SOLUTION INTRAVENOUS at 08:45

## 2023-12-18 RX ADMIN — REMIFENTANIL HYDROCHLORIDE 40 MCG: 1 INJECTION, POWDER, LYOPHILIZED, FOR SOLUTION INTRAVENOUS at 07:37

## 2023-12-18 RX ADMIN — ESMOLOL HYDROCHLORIDE 100 MG: 10 INJECTION, SOLUTION INTRAVENOUS at 08:11

## 2023-12-18 RX ADMIN — GLYCOPYRROLATE 0.2 MG: 0.2 INJECTION INTRAMUSCULAR; INTRAVENOUS at 08:02

## 2023-12-18 RX ADMIN — REMIFENTANIL HYDROCHLORIDE 40 MCG: 1 INJECTION, POWDER, LYOPHILIZED, FOR SOLUTION INTRAVENOUS at 08:23

## 2023-12-18 RX ADMIN — LIDOCAINE HYDROCHLORIDE 100 MG: 20 INJECTION, SOLUTION INFILTRATION; PERINEURAL at 07:37

## 2023-12-18 RX ADMIN — SUGAMMADEX 200 MG: 100 INJECTION, SOLUTION INTRAVENOUS at 10:24

## 2023-12-18 RX ADMIN — REMIFENTANIL HYDROCHLORIDE 40 MCG: 1 INJECTION, POWDER, LYOPHILIZED, FOR SOLUTION INTRAVENOUS at 09:19

## 2023-12-18 RX ADMIN — SODIUM CHLORIDE, POTASSIUM CHLORIDE, SODIUM LACTATE AND CALCIUM CHLORIDE 50 ML/HR: 600; 310; 30; 20 INJECTION, SOLUTION INTRAVENOUS at 10:45

## 2023-12-18 RX ADMIN — CEFAZOLIN 3 G: 330 INJECTION, POWDER, FOR SOLUTION INTRAMUSCULAR; INTRAVENOUS at 07:47

## 2023-12-18 RX ADMIN — EPHEDRINE SULFATE 5 MG: 50 INJECTION, SOLUTION INTRAVENOUS at 08:33

## 2023-12-18 RX ADMIN — PROPOFOL 200 MG: 10 INJECTION, EMULSION INTRAVENOUS at 07:37

## 2023-12-18 RX ADMIN — REMIFENTANIL HYDROCHLORIDE 60 MCG: 1 INJECTION, POWDER, LYOPHILIZED, FOR SOLUTION INTRAVENOUS at 09:27

## 2023-12-18 RX ADMIN — MIDAZOLAM 2 MG: 1 INJECTION INTRAMUSCULAR; INTRAVENOUS at 07:30

## 2023-12-18 RX ADMIN — DEXAMETHASONE SODIUM PHOSPHATE 10 MG: 10 INJECTION INTRAMUSCULAR; INTRAVENOUS at 08:06

## 2023-12-18 RX ADMIN — ROCURONIUM BROMIDE 50 MG: 10 INJECTION INTRAVENOUS at 07:38

## 2023-12-18 RX ADMIN — REMIFENTANIL HYDROCHLORIDE 20 MCG: 1 INJECTION, POWDER, LYOPHILIZED, FOR SOLUTION INTRAVENOUS at 08:35

## 2023-12-18 RX ADMIN — APREPITANT 40 MG: 40 CAPSULE ORAL at 06:52

## 2023-12-18 RX ADMIN — EPHEDRINE SULFATE 5 MG: 50 INJECTION, SOLUTION INTRAVENOUS at 08:28

## 2023-12-18 RX ADMIN — SODIUM CHLORIDE, POTASSIUM CHLORIDE, SODIUM LACTATE AND CALCIUM CHLORIDE: 600; 310; 30; 20 INJECTION, SOLUTION INTRAVENOUS at 06:52

## 2023-12-18 RX ADMIN — ONDANSETRON 4 MG: 2 INJECTION INTRAMUSCULAR; INTRAVENOUS at 10:13

## 2023-12-18 RX ADMIN — EPHEDRINE SULFATE 5 MG: 50 INJECTION, SOLUTION INTRAVENOUS at 08:13

## 2023-12-18 SDOH — HEALTH STABILITY: MENTAL HEALTH: CURRENT SMOKER: 1

## 2023-12-18 ASSESSMENT — PAIN SCALES - GENERAL
PAINLEVEL_OUTOF10: 0 - NO PAIN
PAIN_LEVEL: 0
PAINLEVEL_OUTOF10: 0 - NO PAIN
PAINLEVEL_OUTOF10: 3
PAINLEVEL_OUTOF10: 0 - NO PAIN
PAINLEVEL_OUTOF10: 3

## 2023-12-18 ASSESSMENT — PAIN - FUNCTIONAL ASSESSMENT
PAIN_FUNCTIONAL_ASSESSMENT: 0-10

## 2023-12-18 NOTE — ANESTHESIA PREPROCEDURE EVALUATION
Patient: Ashkna Collado    Procedure Information       Date/Time: 12/18/23 0715    Procedure: LEFT SIDE ENDOSCOPIC TYMPANOPLASTY, ANTROTOMY, OCR, AND CARTILAGE GRAFT (Left)    Location: [facility] OR  / [facility]    Surgeons: Yusuf Salgado MD            Relevant Problems   Endocrine   (+) Obesity, unspecified      Pulmonary   (+) SURESH (obstructive sleep apnea)      Eyes, Ears, Nose, and Throat   (+) Conductive hearing loss, bilateral   (+) Mixed hearing loss, bilateral       Clinical information reviewed:   Tobacco  Allergies  Meds   Med Hx  Surg Hx   Fam Hx  Soc Hx        NPO Detail:  NPO/Void Status  Carbonhydrate Drink Given Prior to Surgery? : N  Date of Last Liquid: 12/18/23  Time of Last Liquid: 0400  Date of Last Solid: 12/17/23  Time of Last Solid: 1930  Last Intake Type: Clear fluids         Physical Exam    Airway  Mallampati: II  TM distance: >3 FB  Neck ROM: full     Cardiovascular   Rhythm: regular     Dental    Pulmonary   Breath sounds clear to auscultation     Abdominal            Anesthesia Plan    ASA 3     general     The patient is a current smoker.    intravenous induction   Postoperative administration of opioids is intended.  Trial extubation is planned.  Anesthetic plan and risks discussed with patient and father.  Use of blood products discussed with patient and father who consented to blood products.    Plan discussed with attending.

## 2023-12-18 NOTE — ANESTHESIA PROCEDURE NOTES
Peripheral IV  Date/Time: 12/18/2023 7:03 AM      Placement  Needle size: 20 G  Laterality: left  Location: hand  Local anesthetic: injectable  Site prep: chlorhexidine  Technique: anatomical landmarks  Attempts: 1

## 2023-12-18 NOTE — ANESTHESIA PROCEDURE NOTES
Arterial Line:    Date/Time: 12/18/2023 8:02 AM    Staffing  Performed: resident and attending   Authorized by: Brooke Cardozo MD    Performed by: Richie Schmidt DO    An arterial line was placed. Procedure performed using surface landmarks.in the OR for the following indication(s): continuous blood pressure monitoring.    A 20 gauge (size) (length), Angiocath (type) catheter was placed into the Right radial artery, secured by Tegaderm,   Seldinger technique used.  Events:  greater than 3 attempts, patient tolerated procedure well with no complications and Initial attempt by resident with no flash. 2nd attempt by attending with success.

## 2023-12-18 NOTE — ANESTHESIA PROCEDURE NOTES
Airway  Date/Time: 12/18/2023 7:39 AM  Urgency: elective    Airway not difficult    Staffing  Performed: resident   Authorized by: Brooke Cardozo MD    Performed by: Richie Schmidt DO  Patient location during procedure: OR    Indications and Patient Condition  Indications for airway management: anesthesia  Spontaneous Ventilation: absent  Sedation level: deep  Preoxygenated: yes  Patient position: sniffing  Mask difficulty assessment: 3 - difficult mask (inadequate, unstable or two providers) +/- NMBA (36 nasal trumpet right nare. Left nare resistance.)  Planned trial extubation    Final Airway Details  Final airway type: endotracheal airway      Successful airway: ETT  Cuffed: yes   Successful intubation technique: video laryngoscopy  Facilitating devices/methods: intubating stylet  Blade: Noel  Blade size: #4  ETT size (mm): 6.5  Cormack-Lehane Classification: grade I - full view of glottis  Placement verified by: chest auscultation and capnometry   Cuff volume (mL): 7  Measured from: lips  ETT to lips (cm): 22  Number of attempts at approach: 1

## 2023-12-18 NOTE — OP NOTE
OPERATIVE NOTE     Date:  2023 OR Location: [facility] OR    Name: Ashkan Collado : 2000, Age: 23 y.o., MRN: [id number], Sex: male      Surgeons      Yusuf Salgado MD    Resident/Fellow/Other Assistant:  Lizzy Ogden MD    Anesthesia: General  ASA: III  Anesthesia Staff: Anesthesiologist: Brooke Cardozo MD  Anesthesia Resident: Richie Schmidt DO  Staff: Circulator: Marina Cash RN  Scrub Person: Alison Shook RN        Preoperative Diagnosis:  1. History of Cholesteatoma   - Left  2. Ossicular discontinuity.   - Left  3. Conductive hearing loss   - Restricted on the contralateral side   - Left      Postoperative Diagnosis:  1. History of Cholesteatoma  - Left  2. Ossicular discontinuity.   - Left  3. Conductive hearing loss   - Restricted on the contralateral side   - Left      Procedure Performed:  1. Tympanoplasty with ossicular chain reconstruction (35432)  - (TEES Tympanoplasty with ossicular chain reconstruction.)  - Left  2. Needle electromyography; cranial nerve supplied muscle(s), unilateral   - Facial Nerve   - Left      Indications:  Ashkan Collado is a very pleasant 23 y.o. male who presents with a history of cholesteatoma in the affected side s/p tympanoplasty and posterior atticotomy and removal of cholesteatoma. The patient now has an ossicular discontinuity, and conductive hearing loss. We plan to do a tympanoplasty with ossicular chain reconstruction, and cartilage graft. The risks, indications, alternatives and complications of surgery were discussed including, but not limited to facial nerve injury, deafness in the operated ear, vertigo, dizziness, imbalance, facial weakness or paralysis, change in sense of taste, perforation of eardrum, pain, bleeding, infection, scarring, need for further surgery, recurrence, prosthesis extrusion, spinal fluid leak, meningitis, brain damage, brain abscess, stroke, and death. Informed consent was obtained and the patient  and family elected to proceed. Because of the extensive nature of the dissection and the close proximity of the facial nerve, facial nerve monitoring was used throughout the case.      Operative Findings:  1. Neotympanum well healed without retractions.   2. No residual cholesteatoma encountered. Hypotympanum, protympanum, mesotympanum, retrotympanum, epitympanum and mastoid explored and no disease observed.  3. Ossicular chain:   - Malleus: absent from previous surgery.Handle only in place  - Incus: Absent.  - Stapes: Suprastructure present. Footplate mobile.  4. Chorda absent  from previous surgery.   5. 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  6. Middle ear mucosa severely inflamed.   7. OCR: Yoko medical Concise PORP 2.25 mm  8. Facial nerve: Not dehiscent in tympanic segment.  9. Eustachian tube: Open.      Operative Technique:   After informed consent was signed and witnessed, the patient was taken back to the operating room and placed supine. After the successful induction of general anesthesia via endotracheal tube intubation, facial nerve bipolar electrodes were placed into the orbicularis oris and oculi muscles to monitor the facial nerve. A small amount of hair was shaved and the patient had nearly 4 cc of 1% lidocaine with 1:100.000 epinephrine injected into their postauricular sulcus and tragus. The ear was prepped and draped in the normal fashion.    Standard 4 quadrant canal injections were performed. The ear canal was irrigated. Using a 0 and 45-degree endoscope throughout the case, a standard stapes tympanomeatal incision was made. The tympanomeatal flap was raised down to the middle ear and the eardrum was reflected anteriorly. The neotympanum was well healed without retractions.    The Malleus and incus had been removed from previous surgery. The stapes footplate was mobile. The stapes suprastructure was present. The footplate was mobile. Chorda was absent. The middle ear mucosa was  moderately inflamed. At this point the entire middle ear was inspected once again with a 45-degree endoscope, and no further disease observed.    Attention was then turned towards reconstruction. The Eustachian tube and middle ear were packed with Gelfoam soaked in saline. The prosthesis was sized and measured to be 2.25 mm in size.    The tympanomeatal flap with prior cartilage graft was then returned to its normal location. The reconstruction was then elevated and a 2.25mm UClass Concise PORP prosthesis 2.25 mm was placed from the stapes to the drum. The reconstructed drum and cartilage was then placed back into position. The tympanomeatal flap was placed back into position. It was secured with Gelfoam and Bactroban ointment. The wound was then covered with a cotton ball, and a Band-Aid.    This completed the procedure. The patient was then emerged from anesthesia and extubated without difficulty. The patient was then transported back to PACU. There were no apparent complications. Following the procedure, I discussed the findings with the patient's family and answered all of their questions.     Attending Attestation: I was present and scrubbed for the entire procedure.      Implants:   Implant Name Type Inv. Item Serial No.  Lot No. LRB No. Used Action   ALTO, CENTERED, PARTIAL, CONCISE - [id number] Cochlear Implant ALTO, CENTERED, PARTIAL, CONCISE  [id number] Left 1 Implanted     Specimens: No specimens collected  Estimated Blood Loss: Minimal  Complications: none  Condition of the patient: Stable  Disposition: PACU    ____________________________________________________  Yusuf Candelaria MD  Professor and Chief   Otology/Neurotology/Lateral Skull-Base Surgery   [facility]  Phone: [phone]  Fax: [phone]

## 2023-12-18 NOTE — ANESTHESIA POSTPROCEDURE EVALUATION
Patient: Ashkan Collado    Procedure Summary       Date: 12/18/23 Room / Location: [facility] OR 05 / [facility]    Anesthesia Start: 0732 Anesthesia Stop: 1048    Procedure: LEFT SIDE ENDOSCOPIC TYMPANOPLASTY, OCR (Left) Diagnosis:       Unspecified cholesteatoma, bilateral      (Unspecified cholesteatoma, bilateral [H71.93])    Surgeons: Yusuf Salgado MD Responsible Provider: Brooke Cardozo MD    Anesthesia Type: general ASA Status: 3            Anesthesia Type: general    Vitals Value Taken Time   /59 12/18/23 1200   Temp 36.3 °C (97.3 °F) 12/18/23 1200   Pulse 77 12/18/23 1200   Resp 14 12/18/23 1200   SpO2 96 % 12/18/23 1200       Anesthesia Post Evaluation    Patient location during evaluation: PACU  Patient participation: complete - patient participated  Level of consciousness: awake and alert  Pain score: 0  Pain management: adequate  Airway patency: patent  Cardiovascular status: acceptable  Respiratory status: acceptable  Hydration status: acceptable  Postoperative Nausea and Vomiting: none        No notable events documented.

## 2023-12-18 NOTE — DISCHARGE INSTRUCTIONS
Most ear surgeries should have a 2-4 week postoperative appointment. Please be sure to call the doctor's office and make a follow-up appointment, if you don't already have it.  Dressing or Band-Aid can be removed the day after surgery.  Once removed, replace the cotton ball in the ear as needed.  Once the dressing is off, and if you have an incision behind your ear with stitches, clean the incision twice daily with soap and water and apply Vaseline or antibiotic ointment after cleaning. If you have paper strips or surgical glue over the incision, Do not apply anything behind the ear.  Bloody drainage from the ear is common.  Call the office if discharge from the ear last longer than 21 days or develops an odor or color.  Water should be kept out of the ear until it is healed. You may shower the day after surgery, if you keep your head dry.  The hair may be shampooed 2 days following surgery, providing water is not allowed into the ear canal.  A cotton ball covered with Vaseline should be used in the ear whenever you are around water.    Bloody discharge from incision area may occur during the first 10 days following surgery.  If this persists or increases, please call the office.  A full sensation with popping sounds may be noticed during the healing process.  DO NOT BLOW YOUR NOSE FOR THREE WEEKS FOLLOWING SURGERY. If you sneeze, do so with your mouth open for three weeks following surgery. Do not use a straw to drink beverages for 3 weeks following surgery.  Do not use Q-Tips or put anything in the canal, until approved by your doctor.  Do not be concerned regarding your hearing for a period of six to eight weeks following surgery.  Your hearing will be evaluated at this time; until then, your hearing may sound muffled and your voice may echo in your ear during speech.  Minor swelling of the face on the same side of the surgery is not uncommon.  Small bruising near the eye or mouth is not uncommon from the  facial nerve monitor.  Dizziness, ringing in the ear, and taste disturbance after surgery are common. Call if severe.   You might notice pain when chewing, please use soft diet for 2 weeks if you experience this.  No lifting (more than 10 lbs) or straining until follow up.  You will be discharged on pain medications and usually antibiotics.  You may resume your routine medications as directed, unless you have been instructed otherwise by the prescribing healthcare provider.  Should you experience any difficulty upon returning home, or if you simply have questions, please contact us.  As your surgeons, we are most familiar with your operation and postoperative procedures.  We are accessible by telephone 24 hours a day, 7 days a week.  Once we have assessed your situation, we will be prepared to make specific suggestions for your care.

## 2023-12-19 ASSESSMENT — PAIN SCALES - GENERAL: PAINLEVEL_OUTOF10: 1

## 2024-01-18 ENCOUNTER — OFFICE VISIT (OUTPATIENT)
Dept: OTOLARYNGOLOGY | Facility: CLINIC | Age: 24
End: 2024-01-18
Payer: COMMERCIAL

## 2024-01-18 VITALS — HEIGHT: 76 IN | WEIGHT: 315 LBS | TEMPERATURE: 97 F | BODY MASS INDEX: 38.36 KG/M2

## 2024-01-18 DIAGNOSIS — H90.0 CONDUCTIVE HEARING LOSS, BILATERAL: ICD-10-CM

## 2024-01-18 DIAGNOSIS — H71.93 CHOLESTEATOMA OF BOTH EARS: Primary | ICD-10-CM

## 2024-01-18 DIAGNOSIS — H74.21 DISCONTINUITY OF OSSICLES OF RIGHT EAR: ICD-10-CM

## 2024-01-18 DIAGNOSIS — H95.191 ENCOUNTER FOR DEBRIDEMENT OF RIGHT POSTMASTOIDECTOMY CAVITY: ICD-10-CM

## 2024-01-18 PROCEDURE — 3008F BODY MASS INDEX DOCD: CPT | Performed by: OTOLARYNGOLOGY

## 2024-01-18 PROCEDURE — 1036F TOBACCO NON-USER: CPT | Performed by: OTOLARYNGOLOGY

## 2024-01-18 PROCEDURE — 99024 POSTOP FOLLOW-UP VISIT: CPT | Performed by: OTOLARYNGOLOGY

## 2024-01-18 RX ORDER — CIPROFLOXACIN AND DEXAMETHASONE 3; 1 MG/ML; MG/ML
5 SUSPENSION/ DROPS AURICULAR (OTIC) 2 TIMES DAILY
Qty: 7.5 ML | Refills: 0 | Status: SHIPPED | OUTPATIENT
Start: 2024-01-18 | End: 2024-02-01

## 2024-01-18 ASSESSMENT — PATIENT HEALTH QUESTIONNAIRE - PHQ9
2. FEELING DOWN, DEPRESSED OR HOPELESS: NOT AT ALL
SUM OF ALL RESPONSES TO PHQ9 QUESTIONS 1 AND 2: 0
1. LITTLE INTEREST OR PLEASURE IN DOING THINGS: NOT AT ALL

## 2024-01-18 NOTE — LETTER
January 20, 2024     Adam Kan MD  [id number]  [facility] [address]    Patient: Ashkan Collado   YOB: 2000   Date of Visit: 1/18/2024       Dear Dr. Adam Kan MD:    Thank you for referring Ashkan Collado to me for evaluation. Below are my notes for this consultation.  If you have questions, please do not hesitate to call me. I look forward to following your patient along with you.       Sincerely,     Yusuf Salgado MD      CC: No Recipients  ______________________________________________________________________________________            Reason for Consult:  Post-op     Subjective  History Of Present Illness:  Ashkan Collado is a 23 y.o. male with bilateral cholesteatomas. They were both very extensive. On the left side, he had an attic cholesteatoma extending to the mastoid and extending laterally to the cortical bone of the mastoid. The incus was eroded and the stapes was intact. He is s/p left-sided tympanomastoidectomy with removal of cholesteatoma cartilage graft on 02/2023. We did not reconstruct the ossicular chain at that time.     I took him to the OR on 03/2023 and performed a Right modified radical tympanomastoidectomy with meatoplasty and removal of the tip of the mastoid due to erosion of the posterior ear canal wall. Since surgery, he has been doing well without any problems.      I took him to the OR on 12/18/23 for a left second look tympanoplasty with ossicular chain reconstruction.     During surgery, we encountered:  1. Neotympanum well healed without retractions.   2. No residual cholesteatoma encountered. Hypotympanum, protympanum, mesotympanum, retrotympanum, epitympanum and mastoid explored and no disease observed.  3. Ossicular chain:   - Malleus: absent from previous surgery.Handle only in place  - Incus: Absent.  - Stapes: Suprastructure present. Footplate mobile.  4. Chorda absent  from previous surgery.   5.  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  6. Middle ear mucosa severely inflamed.   7. OCR: Yoko medical Concise PORP 2.25 mm  8. Facial nerve: Not dehiscent in tympanic segment.  9. Eustachian tube: Open.    He states that he still cannot hear out of the left ear. He ran out of Ciprodex and so hasn't been able to place the drops recently. Otherwise no significant drainage.       Past Medical History:  He has a past medical history of ADHD (attention deficit hyperactivity disorder), Anxiety, HL (hearing loss), Obstructive sleep apnea (adult) (pediatric) (01/28/2017), and Perforation of right tympanic membrane (11/11/2023).    Surgical History:  He has a past surgical history that includes Tonsillectomy (02/02/2015); Other surgical history (01/28/2017); and Tympanoplasty (02/03/2023).     Social History:  He reports that he quit smoking about 2 years ago. His smoking use included pipe. He has never used smokeless tobacco. He reports that he does not currently use alcohol. Drug use questions deferred to the physician.    Family History:  family history includes Diabetes in an other family member; Hyperlipidemia in his mother's brother and another family member; Obesity in his father, mother's brother, and another family member; Sleep apnea in his father and another family member.     Medications:  Current Outpatient Medications   Medication Instructions   • oxyCODONE-acetaminophen (Percocet) 5-325 mg tablet 1 tablet, oral, Every 6 hours PRN      Allergies:  Patient has no known allergies.    Review of Systems:   A comprehensive 10-point review of systems was obtained including constitutional, neurological, HEENT, pulmonary, cardiovascular, genito-urinary, and other pertinent systems and was negative except as noted in the HPI.     Objective  Physical Exam:  Last Recorded Vitals: Temperature 36.1 °C (97 °F), height 1.93 m (6' 4\"), weight (!) 167 kg (368 lb 9.6 oz).    On physical exam, the patient is a " well-nourished, well-developed patient, in no acute distress, able to communicate without assistance in English language. Head and face is atraumatic and normocephalic. Salivary glands are intact. Facial strength is symmetrical bilaterally.       On ear examination:  Right ear: The canal wall down cavity looks intact and in good condition. The neotympanum is intact and in good condition. BC>AC  Left ear:  The neotympanum is intact and in good condition. Dried blood and packing was removed.  BC>AC  The Saez is midline    On neuro exam, the patient is alert and oriented x3, cranial nerves are grossly intact, cerebellar exam is normal.      The rest of the exam, including anterior rhinoscopy, oropharyngeal exam, neck exam, and cardiovascular exam, were normal including no palpable lymphadenopathies, thyroid in the midline position, normal pulses, and normal chest excursion.       Reviewed Results:  Audiology Testing:  I reviewed his audiogram from 08/2023 that showed a mild left-sided conductive hearing loss and a moderate maximum conductive hearing loss on the right side. He has a 100% discrimination bilaterally.       I reviewed his audiogram from 01/2023 that showed a moderate conductive hearing loss on the left side and moderate to severe conductive hearing loss on the right side. He has 100% discrimination bilaterally.       Imaging:  I reviewed his CT scan from 12/2022 that showed a right-sided cholesteatoma with an erosion of the ear canal, absent incus and stapes. The lateral canal is intact. He has a very anterior sigmoid and a low line tegmen on the right side. On the left side, he has an epitympanic cholesteatoma with erosion of the incus and debris in the mastoid and attic. The cortex is eroded. The stapes seems to be present.      Assessment/Plan  In summary, Ashkan Collado is a 23 y.o. male with bilateral cholesteatomas. They were both very extensive. On the left side, he had an attic cholesteatoma  extending to the mastoid and extending laterally to the cortical bone of the mastoid. The incus was eroded and the stapes was intact. He is s/p left-sided tympanomastoidectomy and removal of the cholesteatoma in 02/2023. Subsequently we did his second look left tympanoplasty with OCR on 12/18/23.   Packing removed today. The Neotympanum looks great. BC still greater than AC.      On the right side, he had had a extensive cholesteatoma with erosion of the posterior wall of the ear canal as well as a very low tegmen with anterior sigmoid that required a canal wall down mastoidectomy done in 02/2023.  I did not reconstruct the ossicular chain either. The cavity has epithelized appropriately and it looks to be in perfect condition.    - Continue drops for 3 more weeks.  - Continue dry ear precautions.  - Follow up in 3 months with an Audiogram.     ____________________________________________________  Yusuf Candelaria MD  Professor and Chief   Otology/Neurotology/Lateral Skull-Base Surgery   [facility]

## 2024-01-18 NOTE — PROGRESS NOTES
Reason for Consult:  Post-op     Subjective   History Of Present Illness:  Ashkan Collado is a 23 y.o. male with bilateral cholesteatomas. They were both very extensive. On the left side, he had an attic cholesteatoma extending to the mastoid and extending laterally to the cortical bone of the mastoid. The incus was eroded and the stapes was intact. He is s/p left-sided tympanomastoidectomy with removal of cholesteatoma cartilage graft on 02/2023. We did not reconstruct the ossicular chain at that time.     I took him to the OR on 03/2023 and performed a Right modified radical tympanomastoidectomy with meatoplasty and removal of the tip of the mastoid due to erosion of the posterior ear canal wall. Since surgery, he has been doing well without any problems.      I took him to the OR on 12/18/23 for a left second look tympanoplasty with ossicular chain reconstruction.     During surgery, we encountered:  1. Neotympanum well healed without retractions.   2. No residual cholesteatoma encountered. Hypotympanum, protympanum, mesotympanum, retrotympanum, epitympanum and mastoid explored and no disease observed.  3. Ossicular chain:   - Malleus: absent from previous surgery.Handle only in place  - Incus: Absent.  - Stapes: Suprastructure present. Footplate mobile.  4. Chorda absent  from previous surgery.   5. 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  6. Middle ear mucosa severely inflamed.   7. OCR: Yoko medical Concise PORP 2.25 mm  8. Facial nerve: Not dehiscent in tympanic segment.  9. Eustachian tube: Open.    He states that he still cannot hear out of the left ear. He ran out of Ciprodex and so hasn't been able to place the drops recently. Otherwise no significant drainage.       Past Medical History:  He has a past medical history of ADHD (attention deficit hyperactivity disorder), Anxiety, HL (hearing loss), Obstructive sleep apnea (adult) (pediatric) (01/28/2017), and Perforation of right  "tympanic membrane (11/11/2023).    Surgical History:  He has a past surgical history that includes Tonsillectomy (02/02/2015); Other surgical history (01/28/2017); and Tympanoplasty (02/03/2023).     Social History:  He reports that he quit smoking about 2 years ago. His smoking use included pipe. He has never used smokeless tobacco. He reports that he does not currently use alcohol. Drug use questions deferred to the physician.    Family History:  family history includes Diabetes in an other family member; Hyperlipidemia in his mother's brother and another family member; Obesity in his father, mother's brother, and another family member; Sleep apnea in his father and another family member.     Medications:  Current Outpatient Medications   Medication Instructions    oxyCODONE-acetaminophen (Percocet) 5-325 mg tablet 1 tablet, oral, Every 6 hours PRN      Allergies:  Patient has no known allergies.    Review of Systems:   A comprehensive 10-point review of systems was obtained including constitutional, neurological, HEENT, pulmonary, cardiovascular, genito-urinary, and other pertinent systems and was negative except as noted in the HPI.     Objective   Physical Exam:  Last Recorded Vitals: Temperature 36.1 °C (97 °F), height 1.93 m (6' 4\"), weight (!) 167 kg (368 lb 9.6 oz).    On physical exam, the patient is a well-nourished, well-developed patient, in no acute distress, able to communicate without assistance in English language. Head and face is atraumatic and normocephalic. Salivary glands are intact. Facial strength is symmetrical bilaterally.       On ear examination:  Right ear: The canal wall down cavity looks intact and in good condition. The neotympanum is intact and in good condition. BC>AC  Left ear:  The neotympanum is intact and in good condition. Dried blood and packing was removed.  BC>AC  The Saez is midline    On neuro exam, the patient is alert and oriented x3, cranial nerves are grossly intact, " cerebellar exam is normal.      The rest of the exam, including anterior rhinoscopy, oropharyngeal exam, neck exam, and cardiovascular exam, were normal including no palpable lymphadenopathies, thyroid in the midline position, normal pulses, and normal chest excursion.       Reviewed Results:  Audiology Testing:  I reviewed his audiogram from 08/2023 that showed a mild left-sided conductive hearing loss and a moderate maximum conductive hearing loss on the right side. He has a 100% discrimination bilaterally.       I reviewed his audiogram from 01/2023 that showed a moderate conductive hearing loss on the left side and moderate to severe conductive hearing loss on the right side. He has 100% discrimination bilaterally.       Imaging:  I reviewed his CT scan from 12/2022 that showed a right-sided cholesteatoma with an erosion of the ear canal, absent incus and stapes. The lateral canal is intact. He has a very anterior sigmoid and a low line tegmen on the right side. On the left side, he has an epitympanic cholesteatoma with erosion of the incus and debris in the mastoid and attic. The cortex is eroded. The stapes seems to be present.      Assessment/Plan   In summary, Ashkan Collado is a 23 y.o. male with bilateral cholesteatomas. They were both very extensive. On the left side, he had an attic cholesteatoma extending to the mastoid and extending laterally to the cortical bone of the mastoid. The incus was eroded and the stapes was intact. He is s/p left-sided tympanomastoidectomy and removal of the cholesteatoma in 02/2023. Subsequently we did his second look left tympanoplasty with OCR on 12/18/23.   Packing removed today. The Neotympanum looks great. BC still greater than AC.      On the right side, he had had a extensive cholesteatoma with erosion of the posterior wall of the ear canal as well as a very low tegmen with anterior sigmoid that required a canal wall down mastoidectomy done in 02/2023.  I did not  reconstruct the ossicular chain either. The cavity has epithelized appropriately and it looks to be in perfect condition.    - Continue drops for 3 more weeks.  - Continue dry ear precautions.  - Follow up in 3 months with an Audiogram.     ____________________________________________________  Yusuf Candelaria MD  Professor and Chief   Otology/Neurotology/Lateral Skull-Base Surgery   [facility]

## 2024-02-14 ENCOUNTER — APPOINTMENT (OUTPATIENT)
Dept: OTOLARYNGOLOGY | Facility: HOSPITAL | Age: 24
End: 2024-02-14
Payer: COMMERCIAL

## 2024-04-22 ENCOUNTER — OFFICE VISIT (OUTPATIENT)
Dept: OTOLARYNGOLOGY | Facility: CLINIC | Age: 24
End: 2024-04-22
Payer: COMMERCIAL

## 2024-04-22 ENCOUNTER — CLINICAL SUPPORT (OUTPATIENT)
Dept: AUDIOLOGY | Facility: CLINIC | Age: 24
End: 2024-04-22
Payer: COMMERCIAL

## 2024-04-22 VITALS — WEIGHT: 315 LBS | BODY MASS INDEX: 38.36 KG/M2 | HEIGHT: 76 IN

## 2024-04-22 DIAGNOSIS — H95.191 ENCOUNTER FOR DEBRIDEMENT OF RIGHT POSTMASTOIDECTOMY CAVITY: ICD-10-CM

## 2024-04-22 DIAGNOSIS — H71.93 CHOLESTEATOMA OF BOTH EARS: Primary | ICD-10-CM

## 2024-04-22 DIAGNOSIS — H90.0 CONDUCTIVE HEARING LOSS, BILATERAL: ICD-10-CM

## 2024-04-22 DIAGNOSIS — H61.22 IMPACTED CERUMEN OF LEFT EAR: ICD-10-CM

## 2024-04-22 DIAGNOSIS — H74.23 DISCONTINUITY OF OSSICLES OF BOTH EARS: ICD-10-CM

## 2024-04-22 DIAGNOSIS — H71.93 CHOLESTEATOMA OF BOTH EARS: ICD-10-CM

## 2024-04-22 DIAGNOSIS — H74.21 DISCONTINUITY OF OSSICLES OF RIGHT EAR: ICD-10-CM

## 2024-04-22 PROCEDURE — 92557 COMPREHENSIVE HEARING TEST: CPT

## 2024-04-22 PROCEDURE — 92567 TYMPANOMETRY: CPT

## 2024-04-22 PROCEDURE — 99214 OFFICE O/P EST MOD 30 MIN: CPT | Performed by: OTOLARYNGOLOGY

## 2024-04-22 PROCEDURE — 69220 CLEAN OUT MASTOID CAVITY: CPT | Performed by: OTOLARYNGOLOGY

## 2024-04-22 PROCEDURE — 69210 REMOVE IMPACTED EAR WAX UNI: CPT | Performed by: OTOLARYNGOLOGY

## 2024-04-22 ASSESSMENT — PATIENT HEALTH QUESTIONNAIRE - PHQ9
SUM OF ALL RESPONSES TO PHQ9 QUESTIONS 1 AND 2: 0
1. LITTLE INTEREST OR PLEASURE IN DOING THINGS: NOT AT ALL
2. FEELING DOWN, DEPRESSED OR HOPELESS: NOT AT ALL

## 2024-04-22 NOTE — PROGRESS NOTES
Reason for Consult:  Follow-up (3 month follow up visit.)     Subjective   History Of Present Illness:  Ashkan Collado is a 23 y.o. male with  bilateral cholesteatomas. They were both very extensive. On the left side, he had an attic cholesteatoma extending to the mastoid and extending laterally to the cortical bone of the mastoid. The incus was eroded and the stapes was intact. He is s/p left-sided tympanomastoidectomy with removal of cholesteatoma cartilage graft on 02/2023. We did not reconstruct the ossicular chain at that time.     I took him to the OR on 03/2023 and performed a Right modified radical tympanomastoidectomy with meatoplasty and removal of the tip of the mastoid due to erosion of the posterior ear canal wall. Since surgery, he has been doing well without any problems.      I took him to the OR on 12/18/23 for a left second look tympanoplasty with ossicular chain reconstruction.      During surgery, we encountered:  1. Neotympanum well healed without retractions.   2. No residual cholesteatoma encountered. Hypotympanum, protympanum, mesotympanum, retrotympanum, epitympanum and mastoid explored and no disease observed.  3. Ossicular chain:   - Malleus: absent from previous surgery.Handle only in place  - Incus: Absent.  - Stapes: Suprastructure present. Footplate mobile.  4. Chorda absent  from previous surgery.   5. 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  6. Middle ear mucosa severely inflamed.   7. OCR: Yoko medical Concise PORP 2.25 mm  8. Facial nerve: Not dehiscent in tympanic segment.  9. Eustachian tube: Open.    Since surgery, he has been doing well without any problems. He has not had any drainage. He still feels that his left hearing has improved, but is not back to where we expect it to be. He continues to have muffled hearing on the right side.      Past Medical History:  He has a past medical history of ADHD (attention deficit hyperactivity disorder), Anxiety, HL  "(hearing loss), Obstructive sleep apnea (adult) (pediatric) (01/28/2017), and Perforation of right tympanic membrane (11/11/2023).    Surgical History:  He has a past surgical history that includes Tonsillectomy (02/02/2015); Other surgical history (01/28/2017); and Tympanoplasty (02/03/2023).     Social History:  He reports that he quit smoking about 2 years ago. His smoking use included pipe. He has never used smokeless tobacco. He reports that he does not currently use alcohol. Drug use questions deferred to the physician.    Family History:  family history includes Diabetes in an other family member; Hyperlipidemia in his mother's brother and another family member; Obesity in his father, mother's brother, and another family member; Sleep apnea in his father and another family member.     Medications:  Current Outpatient Medications   Medication Instructions    oxyCODONE-acetaminophen (Percocet) 5-325 mg tablet 1 tablet, oral, Every 6 hours PRN      Allergies:  Patient has no known allergies.    Review of Systems:   A comprehensive 10-point review of systems was obtained including constitutional, neurological, HEENT, pulmonary, cardiovascular, genito-urinary, and other pertinent systems and was negative except as noted in the HPI.     Objective   Physical Exam:  Last Recorded Vitals: Height 1.918 m (6' 3.5\"), weight (!) 166 kg (365 lb).    On physical exam, the patient is a well-nourished, well-developed patient, in no acute distress, able to communicate without assistance in English language. Head and face is atraumatic and normocephalic. Salivary glands are intact. Facial strength is symmetrical bilaterally.       On ear examination:  Right ear: The patient has a canal wall down cavity that is intact and in good condition. The cavity was debrided. He tolerated this well. The neotympanum looks intact and in good condition.    BC>AC  Left ear: The patient has cerumen impaction which was removed. The neotympanum " is intact and in good condition.  BC>AC  The Saez is right    On vestibular exam, the patient has no spontaneous nystagmus, no headshake nystagmus, no head-thrust nystagmus, and no nystagmus on hyperventilation or Valsalva maneuvers. Chris-Hallpike maneuver is negative bilaterally.       On neuro exam, the patient is alert and oriented x3, cranial nerves are grossly intact, cerebellar exam is normal.      The rest of the exam, including anterior rhinoscopy, oropharyngeal exam, neck exam, and cardiovascular exam, were normal including no palpable lymphadenopathies, thyroid in the midline position, normal pulses, and normal chest excursion.       Reviewed Results:  Audiology Testing:  I personally reviewed the audiogram from 04/2024 that showed a moderate conductive hearing loss on the left side and a moderate severe conductive hearing loss on the right side with 40dB of air-borne gap. He has 100% discrimination bilaterally.       I reviewed his audiogram from 08/2023 that showed a mild left-sided conductive hearing loss and a moderate maximum conductive hearing loss on the right side. He has a 100% discrimination bilaterally.       I reviewed his audiogram from 01/2023 that showed a moderate conductive hearing loss on the left side and moderate to severe conductive hearing loss on the right side. He has 100% discrimination bilaterally.       Imaging:  I reviewed his CT scan from 12/2022 that showed a right-sided cholesteatoma with an erosion of the ear canal, absent incus and stapes. The lateral canal is intact. He has a very anterior sigmoid and a low line tegmen on the right side. On the left side, he has an epitympanic cholesteatoma with erosion of the incus and debris in the mastoid and attic. The cortex is eroded. The stapes seems to be present.        Procedure:  None    Assessment/Plan     In summary, Ashkan Collado is a 23 y.o. male with bilateral cholesteatomas. They were both very extensive. On the left  side, he had an attic cholesteatoma extending to the mastoid and extending laterally to the cortical bone of the mastoid. The incus was eroded and the stapes was intact. He is s/p left-sided tympanomastoidectomy and removal of the cholesteatoma in 02/2023. Subsequently we did his second look left tympanoplasty with OCR on 12/18/23.     On the left side, the neotympanum looks fantastic. He continues to have a moderate conductive hearing loss and we achieved closure of the air-borne gap within 20dB so far.    On the right side, he had had a extensive cholesteatoma with erosion of the posterior wall of the ear canal as well as a very low tegmen with anterior sigmoid that required a canal wall down mastoidectomy done in 02/2023.  I did not reconstruct the ossicular chain either. The cavity has epithelized appropriately and it looks to be in perfect condition.    At this point, he is interested in consideration of a bone conduction implantation on the right side. I am going to see him back in 3 months with a VIOLETA evaluation and a CT scan. We will move forward with the VIOLETA at that point.        Scribe Attestation  By signing my name below, I, Thong Nick   attest that this documentation has been prepared under the direction and in the presence of Yusuf Salgado MD.   ____________________________________________________  Yusuf Candelaria MD  Professor and Chief   Otology/Neurotology/Lateral Skull-Base Surgery   [facility]

## 2024-04-22 NOTE — LETTER
April 23, 2024     Adam Kan MD  [address]  [place] [address]    Patient: Ashkan Collado   YOB: 2000   Date of Visit: 4/22/2024       Dear Dr. Adam Kan MD:    Thank you for referring Ashkan Collado to me for evaluation. Below are my notes for this consultation.  If you have questions, please do not hesitate to call me. I look forward to following your patient along with you.       Sincerely,     Yusuf Salgado MD      CC: No Recipients  ______________________________________________________________________________________            Reason for Consult:  Follow-up (3 month follow up visit.)     Subjective  History Of Present Illness:  Ashkan Collado is a 23 y.o. male with  bilateral cholesteatomas. They were both very extensive. On the left side, he had an attic cholesteatoma extending to the mastoid and extending laterally to the cortical bone of the mastoid. The incus was eroded and the stapes was intact. He is s/p left-sided tympanomastoidectomy with removal of cholesteatoma cartilage graft on 02/2023. We did not reconstruct the ossicular chain at that time.     I took him to the OR on 03/2023 and performed a Right modified radical tympanomastoidectomy with meatoplasty and removal of the tip of the mastoid due to erosion of the posterior ear canal wall. Since surgery, he has been doing well without any problems.      I took him to the OR on 12/18/23 for a left second look tympanoplasty with ossicular chain reconstruction.      During surgery, we encountered:  1. Neotympanum well healed without retractions.   2. No residual cholesteatoma encountered. Hypotympanum, protympanum, mesotympanum, retrotympanum, epitympanum and mastoid explored and no disease observed.  3. Ossicular chain:   - Malleus: absent from previous surgery.Handle only in place  - Incus: Absent.  - Stapes: Suprastructure present. Footplate mobile.  4. Chorda absent  " from previous surgery.   5. 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  6. Middle ear mucosa severely inflamed.   7. OCR: Yoko medical Concise PORP 2.25 mm  8. Facial nerve: Not dehiscent in tympanic segment.  9. Eustachian tube: Open.    Since surgery, he has been doing well without any problems. He has not had any drainage. He still feels that his left hearing has improved, but is not back to where we expect it to be. He continues to have muffled hearing on the right side.      Past Medical History:  He has a past medical history of ADHD (attention deficit hyperactivity disorder), Anxiety, HL (hearing loss), Obstructive sleep apnea (adult) (pediatric) (01/28/2017), and Perforation of right tympanic membrane (11/11/2023).    Surgical History:  He has a past surgical history that includes Tonsillectomy (02/02/2015); Other surgical history (01/28/2017); and Tympanoplasty (02/03/2023).     Social History:  He reports that he quit smoking about 2 years ago. His smoking use included pipe. He has never used smokeless tobacco. He reports that he does not currently use alcohol. Drug use questions deferred to the physician.    Family History:  family history includes Diabetes in an other family member; Hyperlipidemia in his mother's brother and another family member; Obesity in his father, mother's brother, and another family member; Sleep apnea in his father and another family member.     Medications:  Current Outpatient Medications   Medication Instructions   • oxyCODONE-acetaminophen (Percocet) 5-325 mg tablet 1 tablet, oral, Every 6 hours PRN      Allergies:  Patient has no known allergies.    Review of Systems:   A comprehensive 10-point review of systems was obtained including constitutional, neurological, HEENT, pulmonary, cardiovascular, genito-urinary, and other pertinent systems and was negative except as noted in the HPI.     Objective  Physical Exam:  Last Recorded Vitals: Height 1.918 m (6' 3.5\"), weight " (!) 166 kg (365 lb).    On physical exam, the patient is a well-nourished, well-developed patient, in no acute distress, able to communicate without assistance in English language. Head and face is atraumatic and normocephalic. Salivary glands are intact. Facial strength is symmetrical bilaterally.       On ear examination:  Right ear: The patient has a canal wall down cavity that is intact and in good condition. The cavity was debrided. He tolerated this well. The neotympanum looks intact and in good condition.    BC>AC  Left ear: The patient has cerumen impaction which was removed. The neotympanum is intact and in good condition.  BC>AC  The Saez is right    On vestibular exam, the patient has no spontaneous nystagmus, no headshake nystagmus, no head-thrust nystagmus, and no nystagmus on hyperventilation or Valsalva maneuvers. [place]-Hallpike maneuver is negative bilaterally.       On neuro exam, the patient is alert and oriented x3, cranial nerves are grossly intact, cerebellar exam is normal.      The rest of the exam, including anterior rhinoscopy, oropharyngeal exam, neck exam, and cardiovascular exam, were normal including no palpable lymphadenopathies, thyroid in the midline position, normal pulses, and normal chest excursion.       Reviewed Results:  Audiology Testing:  I personally reviewed the audiogram from 04/2024 that showed a moderate conductive hearing loss on the left side and a moderate severe conductive hearing loss on the right side with 40dB of air-borne gap. He has 100% discrimination bilaterally.       I reviewed his audiogram from 08/2023 that showed a mild left-sided conductive hearing loss and a moderate maximum conductive hearing loss on the right side. He has a 100% discrimination bilaterally.       I reviewed his audiogram from 01/2023 that showed a moderate conductive hearing loss on the left side and moderate to severe conductive hearing loss on the right side. He has 100% discrimination  bilaterally.       Imaging:  I reviewed his CT scan from 12/2022 that showed a right-sided cholesteatoma with an erosion of the ear canal, absent incus and stapes. The lateral canal is intact. He has a very anterior sigmoid and a low line tegmen on the right side. On the left side, he has an epitympanic cholesteatoma with erosion of the incus and debris in the mastoid and attic. The cortex is eroded. The stapes seems to be present.        Procedure:  None    Assessment/Plan    In summary, Ashkan Collado is a 23 y.o. male with bilateral cholesteatomas. They were both very extensive. On the left side, he had an attic cholesteatoma extending to the mastoid and extending laterally to the cortical bone of the mastoid. The incus was eroded and the stapes was intact. He is s/p left-sided tympanomastoidectomy and removal of the cholesteatoma in 02/2023. Subsequently we did his second look left tympanoplasty with OCR on 12/18/23.     On the left side, the neotympanum looks fantastic. He continues to have a moderate conductive hearing loss and we achieved closure of the air-borne gap within 20dB so far.    On the right side, he had had a extensive cholesteatoma with erosion of the posterior wall of the ear canal as well as a very low tegmen with anterior sigmoid that required a canal wall down mastoidectomy done in 02/2023.  I did not reconstruct the ossicular chain either. The cavity has epithelized appropriately and it looks to be in perfect condition.    At this point, he is interested in consideration of a bone conduction implantation on the right side. I am going to see him back in 3 months with a VIOLETA evaluation and a CT scan. We will move forward with the VIOLETA at that point.        Scribe Attestation  By signing my name below, I, Thong Nick   attest that this documentation has been prepared under the direction and in the presence of Yusuf Salgado MD.    ____________________________________________________  Yusuf Candelaria MD  Professor and Chief   Otology/Neurotology/Lateral Skull-Base Surgery   [facility]

## 2024-04-22 NOTE — PROGRESS NOTES
AUDIOMETRIC EVALUATION      Name: Ashkan Collado  : 2000  Age: 23 y.o.  Date of Evaluation: 2024   Time: 2351-8733    HISTORY     Ashkan Collado, age 23, was seen today for a hearing evaluation in conjunction with an ENT appointment with Dr. Cnadelaria. 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 Ashkan arrived today reporting that his hearing seems better than it did at his last test in . Ashkan reported bilateral non-bothersome tinnitus. He noted no current drainage and stated that his ears have largely ceased draining. He denied any ear pain, pressure, fullness, dizziness, ear infection since his last surgery, or undisclosed ear trauma.       PROCEDURE     Otoscopy:   Right Ear:  surgical ear canal.  Left Ear:  surgical ear canal.     Tympanometry: 226 Hz probe tone  Right Ear: Tympanometry revealed no measurable compliance with large ear canal volume, consistent with tympanic membrane perforation (Type B).  Left Ear: Tympanometry revealed no measurable compliance with large ear canal volume, consistent with tympanic membrane perforation (Type B).     Ipsilateral Acoustic Reflexes:   Right Ear: DNT due to middle ear status.   Left Ear: DNT due to middle ear status.     Pure Tone Audiometry: Conventional audiometry via insert ear phones with good reliability  Right Ear: Severe rising to moderately severe mixed hearing loss.   Left Ear: Moderately severe rising to essentially moderate mixed hearing loss.     Speech Audiometry:   Right Ear:    Speech reception threshold: 75 dB HL  Word Recognition Score was 100% when presented at 100 dB HL. Recorded NU6 list utilized.   Left Ear:   Speech reception threshold: 55 dB HL  Word Recognition Score was 100% when presented at  95 dB HL. Recorded NU6 list utilized.     IMPRESSIONS AND RECOMMENDATIONS      Results were discussed with patient. Results  indicate bilateral type B tympanograms with large ear canal volumes, severe rising to moderately severe mixed hearing in the right ear, and moderately severe rising to moderate mixed hearing loss in the left ear. Ashkan's air conduction thresholds have progressed since his last hearing evaluation. Ashkan's bone conduction thresholds remain stable.    Hearing aids were discussed as a management option for hearing loss. It was recommended that the patient discuss his options with Dr. Candelaria to determine appropriate options available given his case history and hearing loss. Patient affirmed understanding of this treatment plan going forward. He was provided a physical copy of today's results.     TREATMENT PLAN     Follow up with ENT. Appointment scheduled for today with Dr. Candelaria.   Retest hearing in conjunction with medical care.  Hearing assistive technology pending medical clearance and patient readiness.  Follow up with medical providers as indicated.      GENARO Anderson under the supervision of Mady Ritchie CCC-A       AUDIOGRAM

## 2024-05-06 ENCOUNTER — CLINICAL SUPPORT (OUTPATIENT)
Dept: AUDIOLOGY | Facility: CLINIC | Age: 24
End: 2024-05-06
Payer: COMMERCIAL

## 2024-05-06 DIAGNOSIS — H71.93 CHOLESTEATOMA OF BOTH EARS: Primary | ICD-10-CM

## 2024-05-06 DIAGNOSIS — H61.22 IMPACTED CERUMEN OF LEFT EAR: ICD-10-CM

## 2024-05-06 DIAGNOSIS — H74.23 DISCONTINUITY OF OSSICLES OF BOTH EARS: ICD-10-CM

## 2024-05-06 DIAGNOSIS — H95.191 ENCOUNTER FOR DEBRIDEMENT OF RIGHT POSTMASTOIDECTOMY CAVITY: ICD-10-CM

## 2024-05-06 DIAGNOSIS — H90.0 CONDUCTIVE HEARING LOSS, BILATERAL: ICD-10-CM

## 2024-05-06 PROCEDURE — 92626 EVAL AUD FUNCJ 1ST HOUR: CPT

## 2024-05-06 NOTE — PROGRESS NOTES
AUDIOLOGY BONE ANCHORED IMPLANT EVALUATION    Name:  Ashkan Collado  :  2000  Age:  23 y.o.  Date of Evaluation: 2024    HISTORY  flo Collado, age 23, was seen today for a hearing evaluation in conjunction with an ENT appointment with Dr. Candelaria. 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 Ashkan arrived today reporting that his hearing seems better than it did at his last test in . Ashkan reported bilateral non-bothersome tinnitus. He noted no current drainage and stated that his ears have largely ceased draining. He denied any ear pain, pressure, fullness, dizziness, ear infection since his last surgery, or undisclosed ear trauma.     He is interested in an [place]ia over BAHA but is open to both. If we proceed he would like an OSIA and Mini Spenser    AIDED TESTING   Functional gain testing was completed with the BAHA 6 Max on a softband on P1 - programmed to hearing thresholds. Aided thresholds were obtained from 25 dBHL - 40 dBHL for the frequencies of 250-6000 Hz.     Testing completed at 45 degrees azimuth to the speaker and speech noise was present to the left ear at 45 degrees azimuth. Word recognition completed with recorded material at 50dB HL +10 SNR noise to the contralateral ear.      AIDED: CNC words at 50 dB HL= 62%    AIDED: AzBIO sentences at 50 dB HL = 97%     UNAIDED: CNC words at 50 dB HL=32%    UNAIDED: AzBIO sentences at 50 dB HL = 81%     IMPRESSION   Functional testing revealed fair word and excellentsentences recognition when noise is introduced to the BAHA on a softband. This is a significant improvement as compared to unaided word and sentence recognition.     Discussed the advantages and limitations of the VIOLETA system including the percutaneous, transcutaneous, softband/SoundArc, and provided the patient with  information from  [employer]. Discussed general care and maintenance of having a VIOLETA system and the exhibits the ability to appropriately care for an abutment and implant location.    TREATMENT PLAN:    1. Continue medical follow-up with our otologist/ENT team for further consideration of a VIOLETA.  2. Return after ENT surgical consultation for a VIOLETA consultation/device selection.   3. Return loaner device at scheduled follow up appointment.  4. Annual hearing assessments.  5. Contact clinic with questions or concerns at [phone].    Completed by:  Mady Ritchie, CCC-A, [facility]  Licensed Audiologist

## 2024-07-22 ENCOUNTER — HOSPITAL ENCOUNTER (OUTPATIENT)
Dept: RADIOLOGY | Facility: CLINIC | Age: 24
Discharge: HOME | End: 2024-07-22
Payer: COMMERCIAL

## 2024-07-22 DIAGNOSIS — H61.22 IMPACTED CERUMEN OF LEFT EAR: ICD-10-CM

## 2024-07-22 DIAGNOSIS — H71.93 CHOLESTEATOMA OF BOTH EARS: ICD-10-CM

## 2024-07-22 DIAGNOSIS — H90.0 CONDUCTIVE HEARING LOSS, BILATERAL: ICD-10-CM

## 2024-07-22 DIAGNOSIS — H95.191 ENCOUNTER FOR DEBRIDEMENT OF RIGHT POSTMASTOIDECTOMY CAVITY: ICD-10-CM

## 2024-07-22 DIAGNOSIS — H74.23 DISCONTINUITY OF OSSICLES OF BOTH EARS: ICD-10-CM

## 2024-07-22 PROCEDURE — 70480 CT ORBIT/EAR/FOSSA W/O DYE: CPT | Performed by: RADIOLOGY

## 2024-07-22 PROCEDURE — 70480 CT ORBIT/EAR/FOSSA W/O DYE: CPT

## 2024-07-27 NOTE — PROGRESS NOTES
Reason for Consult:  Follow-up (Bilateral cholesteatomas)    Subjective   History Of Present Illness:  Ashkan Collado is a 23 y.o. male with bilateral cholesteatomas. They were both very extensive. On the left side, he had an attic cholesteatoma extending to the mastoid and extending laterally to the cortical bone of the mastoid. The incus was eroded and the stapes was intact. He is s/p left-sided tympanomastoidectomy with removal of cholesteatoma cartilage graft on 02/2023. We did not reconstruct the ossicular chain at that time.     I took him to the OR on 03/2023 and performed a right modified radical tympanomastoidectomy with meatoplasty and removal of the tip of the mastoid due to erosion of the posterior ear canal wall. Since surgery, he has been doing well without any problems.      I took him to the OR on 12/18/23 for a left second look tympanoplasty with ossicular chain reconstruction.     During surgery, we encountered:  1. Neotympanum well healed without retractions.   2. No residual cholesteatoma encountered. Hypotympanum, protympanum, mesotympanum, retrotympanum, epitympanum and mastoid explored and no disease observed.  3. Ossicular chain:   - Malleus: absent from previous surgery.Handle only in place  - Incus: Absent.  - Stapes: Suprastructure present. Footplate mobile.  4. Chorda absent  from previous surgery.   5. 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  6. Middle ear mucosa severely inflamed.   7. OCR: [employer] PORP 2.25 mm  8. Facial nerve: Not dehiscent in tympanic segment.  9. Eustachian tube: Open.    Since surgery, he has been doing well without any problems. He has not had any drainage. He reports that his hearing has significantly improved on the left side. On last visit, we ordered VIOLETA evaluation in consideration for hearing rehabilitation on the right side.    Past Medical History:  He has a past medical history of ADHD (attention deficit hyperactivity  "disorder), Anxiety, HL (hearing loss), Obstructive sleep apnea (adult) (pediatric) (01/28/2017), and Perforation of right tympanic membrane (11/11/2023).    Surgical History:  He has a past surgical history that includes Tonsillectomy (02/02/2015); Other surgical history (01/28/2017); and Tympanoplasty (02/03/2023).     Social History:  He reports that he quit smoking about 2 years ago. His smoking use included pipe. He has never used smokeless tobacco. He reports that he does not currently use alcohol. Drug use questions deferred to the physician.    Family History:  family history includes Diabetes in an other family member; Hyperlipidemia in his mother's brother and another family member; Obesity in his father, mother's brother, and another family member; Sleep apnea in his father and another family member.     Medications:  Current Outpatient Medications   Medication Instructions    oxyCODONE-acetaminophen (Percocet) 5-325 mg tablet 1 tablet, oral, Every 6 hours PRN      Allergies:  Patient has no known allergies.    Review of Systems:   A comprehensive 10-point review of systems was obtained including constitutional, neurological, HEENT, pulmonary, cardiovascular, genito-urinary, and other pertinent systems and was negative except as noted in the HPI.     Objective   Physical Exam:  Last Recorded Vitals: Height 1.93 m (6' 4\"), weight (!) 170 kg (375 lb).    On physical exam, the patient is a well-nourished, well-developed patient, in no acute distress, able to communicate without assistance in English language. Head and face is atraumatic and normocephalic. Salivary glands are intact. Facial strength is symmetrical bilaterally.       On ear examination:  Right ear: The patient has a canal wall down cavity that is intact and in good condition. The neotympanum looks intact and in good condition.    BC>AC  Left ear: The patient has an open and patent ear canal. The neotympanum is intact and in good " condition.  BC>AC  The Saez is midline    On vestibular exam, the patient has no spontaneous nystagmus, no headshake nystagmus, no head-thrust nystagmus, and no nystagmus on hyperventilation or Valsalva maneuvers. Chris-Hallpike maneuver is negative bilaterally.       On neuro exam, the patient is alert and oriented x3, cranial nerves are grossly intact, cerebellar exam is normal.      The rest of the exam, including anterior rhinoscopy, oropharyngeal exam, neck exam, and cardiovascular exam, were normal including no palpable lymphadenopathies, thyroid in the midline position, normal pulses, and normal chest excursion.    Reviewed Results:  Audiology Testing:  I reviewed the patient's VIOLETA evaluation that shows:  Unaided he gets 32% on CNC and 81% on [employer] on the right   Using a BAHA on a soft band, he gets 62% on CNC and 99% on [employer] on the right      I personally reviewed the audiogram from 4/2024 that showed a moderate conductive hearing loss on the left side and a moderate severe conductive hearing loss on the right side with 40dB of air-borne gap. He has 100% discrimination bilaterally.      I reviewed his audiogram from 08/2023 that showed a mild left-sided conductive hearing loss and a moderate maximum conductive hearing loss on the right side. He has a 100% discrimination bilaterally.       I reviewed his audiogram from 01/2023 that showed a moderate conductive hearing loss on the left side and moderate to severe conductive hearing loss on the right side. He has 100% discrimination bilaterally.       Imaging:  I reviewed the patient's CT IAC from 7/2024 which showed:  Canal wall down cavity on the right side in good condition without evidence of cholesteatoma. On the left side, the middle ear space is well ventilated with a PORP making good contact with the stapes and the neotympanum. There is no evidence of recurrent cholesteatoma.    I reviewed his CT scan from 12/2022 that showed a right-sided cholesteatoma  with an erosion of the ear canal, absent incus and stapes. The lateral canal is intact. He has a very anterior sigmoid and a low line tegmen on the right side. On the left side, he has an epitympanic cholesteatoma with erosion of the incus and debris in the mastoid and attic. The cortex is eroded. The stapes seems to be present.     Procedure:  None     Assessment/Plan   1. Cholesteatoma of both ears    2. Conductive hearing loss, bilateral    3. Discontinuity of ossicles of both ears    4. Encounter for debridement of right postmastoidectomy cavity      In summary, Ashkan Collado is a 23 y.o. male with bilateral cholesteatomas. They were both very extensive. On the left side, he had an attic cholesteatoma extending to the mastoid and extending laterally to the cortical bone of the mastoid. The incus was eroded and the stapes was intact. He is s/p left-sided tympanomastoidectomy and removal of the cholesteatoma in 02/2023. Subsequently we did his second look left tympanoplasty with OCR on 12/18/23.    On the left side, the neotympanum looks fantastic. He continues to have a moderate conductive hearing loss and we achieved closure of the air-borne gap within 20 dB so far.    On the right side, he had had a extensive cholesteatoma with erosion of the posterior wall of the ear canal as well as a very low tegmen with anterior sigmoid that required a canal wall down mastoidectomy done in 2/2023. I did not reconstruct the ossicular chain either. The cavity has epithelized appropriately and it looks to be in perfect condition.    He had a bone conduction evaluation which showed that he is a good candidate for an OSIA on the right side. He feels that his hearing on the left side is improving. For that reason, he would like to hold on having the OSIA. I will plan to see him again in 6 months with an audiogram.       Scribe Attestation  By signing my name below, I, Thong Cardona, attest that this documentation has  been prepared under the direction and in the presence of Yusuf Salgado MD.  ____________________________________________________  Yusuf Candelaria MD  Professor and Chief   Otology/Neurotology/Lateral Skull-Base Surgery   [facility]

## 2024-07-29 ENCOUNTER — APPOINTMENT (OUTPATIENT)
Dept: OTOLARYNGOLOGY | Facility: CLINIC | Age: 24
End: 2024-07-29
Payer: COMMERCIAL

## 2024-07-29 VITALS — HEIGHT: 76 IN | BODY MASS INDEX: 38.36 KG/M2 | WEIGHT: 315 LBS

## 2024-07-29 DIAGNOSIS — H90.0 CONDUCTIVE HEARING LOSS, BILATERAL: ICD-10-CM

## 2024-07-29 DIAGNOSIS — H74.23 DISCONTINUITY OF OSSICLES OF BOTH EARS: ICD-10-CM

## 2024-07-29 DIAGNOSIS — H95.191 ENCOUNTER FOR DEBRIDEMENT OF RIGHT POSTMASTOIDECTOMY CAVITY: ICD-10-CM

## 2024-07-29 DIAGNOSIS — H71.93 CHOLESTEATOMA OF BOTH EARS: Primary | ICD-10-CM

## 2024-07-29 PROCEDURE — 3008F BODY MASS INDEX DOCD: CPT | Performed by: OTOLARYNGOLOGY

## 2024-07-29 PROCEDURE — 99213 OFFICE O/P EST LOW 20 MIN: CPT | Performed by: OTOLARYNGOLOGY

## 2024-07-29 NOTE — LETTER
August 1, 2024     Adam Kan MD  [id number]  [place] [address]    Patient: Ashkan Collado   YOB: 2000   Date of Visit: 7/29/2024       Dear Dr. Adam Kan MD:    Thank you for referring Ashkan Collado to me for evaluation. Below are my notes for this consultation.  If you have questions, please do not hesitate to call me. I look forward to following your patient along with you.       Sincerely,     Yusuf Salgado MD      CC: No Recipients  ______________________________________________________________________________________          Reason for Consult:  Follow-up (Bilateral cholesteatomas)    Subjective  History Of Present Illness:  Ashkan Collado is a 23 y.o. male with bilateral cholesteatomas. They were both very extensive. On the left side, he had an attic cholesteatoma extending to the mastoid and extending laterally to the cortical bone of the mastoid. The incus was eroded and the stapes was intact. He is s/p left-sided tympanomastoidectomy with removal of cholesteatoma cartilage graft on 02/2023. We did not reconstruct the ossicular chain at that time.     I took him to the OR on 03/2023 and performed a right modified radical tympanomastoidectomy with meatoplasty and removal of the tip of the mastoid due to erosion of the posterior ear canal wall. Since surgery, he has been doing well without any problems.      I took him to the OR on 12/18/23 for a left second look tympanoplasty with ossicular chain reconstruction.     During surgery, we encountered:  1. Neotympanum well healed without retractions.   2. No residual cholesteatoma encountered. Hypotympanum, protympanum, mesotympanum, retrotympanum, epitympanum and mastoid explored and no disease observed.  3. Ossicular chain:   - Malleus: absent from previous surgery.Handle only in place  - Incus: Absent.  - Stapes: Suprastructure present. Footplate mobile.  4. Chorda absent   from previous surgery.   5. 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  6. Middle ear mucosa severely inflamed.   7. OCR: [employer] PORP 2.25 mm  8. Facial nerve: Not dehiscent in tympanic segment.  9. Eustachian tube: Open.    Since surgery, he has been doing well without any problems. He has not had any drainage. He reports that his hearing has significantly improved on the left side. On last visit, we ordered VIOLETA evaluation in consideration for hearing rehabilitation on the right side.    Past Medical History:  He has a past medical history of ADHD (attention deficit hyperactivity disorder), Anxiety, HL (hearing loss), Obstructive sleep apnea (adult) (pediatric) (01/28/2017), and Perforation of right tympanic membrane (11/11/2023).    Surgical History:  He has a past surgical history that includes Tonsillectomy (02/02/2015); Other surgical history (01/28/2017); and Tympanoplasty (02/03/2023).     Social History:  He reports that he quit smoking about 2 years ago. His smoking use included pipe. He has never used smokeless tobacco. He reports that he does not currently use alcohol. Drug use questions deferred to the physician.    Family History:  family history includes Diabetes in an other family member; Hyperlipidemia in his mother's brother and another family member; Obesity in his father, mother's brother, and another family member; Sleep apnea in his father and another family member.     Medications:  Current Outpatient Medications   Medication Instructions   • oxyCODONE-acetaminophen (Percocet) 5-325 mg tablet 1 tablet, oral, Every 6 hours PRN      Allergies:  Patient has no known allergies.    Review of Systems:   A comprehensive 10-point review of systems was obtained including constitutional, neurological, HEENT, pulmonary, cardiovascular, genito-urinary, and other pertinent systems and was negative except as noted in the HPI.     Objective  Physical Exam:  Last Recorded Vitals: Height  "1.93 m (6' 4\"), weight (!) 170 kg (375 lb).    On physical exam, the patient is a well-nourished, well-developed patient, in no acute distress, able to communicate without assistance in English language. Head and face is atraumatic and normocephalic. Salivary glands are intact. Facial strength is symmetrical bilaterally.       On ear examination:  Right ear: The patient has a canal wall down cavity that is intact and in good condition. The neotympanum looks intact and in good condition.    BC>AC  Left ear: The patient has an open and patent ear canal. The neotympanum is intact and in good condition.  BC>AC  The Saez is midline    On vestibular exam, the patient has no spontaneous nystagmus, no headshake nystagmus, no head-thrust nystagmus, and no nystagmus on hyperventilation or Valsalva maneuvers. Chris-Hallpike maneuver is negative bilaterally.       On neuro exam, the patient is alert and oriented x3, cranial nerves are grossly intact, cerebellar exam is normal.      The rest of the exam, including anterior rhinoscopy, oropharyngeal exam, neck exam, and cardiovascular exam, were normal including no palpable lymphadenopathies, thyroid in the midline position, normal pulses, and normal chest excursion.    Reviewed Results:  Audiology Testing:  I reviewed the patient's VIOLETA evaluation that shows:  Unaided he gets 32% on CNC and 81% on [employer] on the right   Using a BAHA on a soft band, he gets 62% on CNC and 99% on [employer] on the right      I personally reviewed the audiogram from 4/2024 that showed a moderate conductive hearing loss on the left side and a moderate severe conductive hearing loss on the right side with 40dB of air-borne gap. He has 100% discrimination bilaterally.      I reviewed his audiogram from 08/2023 that showed a mild left-sided conductive hearing loss and a moderate maximum conductive hearing loss on the right side. He has a 100% discrimination bilaterally.       I reviewed his audiogram from " 01/2023 that showed a moderate conductive hearing loss on the left side and moderate to severe conductive hearing loss on the right side. He has 100% discrimination bilaterally.       Imaging:  I reviewed the patient's CT IAC from 7/2024 which showed:  Canal wall down cavity on the right side in good condition without evidence of cholesteatoma. On the left side, the middle ear space is well ventilated with a PORP making good contact with the stapes and the neotympanum. There is no evidence of recurrent cholesteatoma.    I reviewed his CT scan from 12/2022 that showed a right-sided cholesteatoma with an erosion of the ear canal, absent incus and stapes. The lateral canal is intact. He has a very anterior sigmoid and a low line tegmen on the right side. On the left side, he has an epitympanic cholesteatoma with erosion of the incus and debris in the mastoid and attic. The cortex is eroded. The stapes seems to be present.     Procedure:  None     Assessment/Plan  1. Cholesteatoma of both ears    2. Conductive hearing loss, bilateral    3. Discontinuity of ossicles of both ears    4. Encounter for debridement of right postmastoidectomy cavity      In summary, Ashkan Collado is a 23 y.o. male with bilateral cholesteatomas. They were both very extensive. On the left side, he had an attic cholesteatoma extending to the mastoid and extending laterally to the cortical bone of the mastoid. The incus was eroded and the stapes was intact. He is s/p left-sided tympanomastoidectomy and removal of the cholesteatoma in 02/2023. Subsequently we did his second look left tympanoplasty with OCR on 12/18/23.    On the left side, the neotympanum looks fantastic. He continues to have a moderate conductive hearing loss and we achieved closure of the air-borne gap within 20 dB so far.    On the right side, he had had a extensive cholesteatoma with erosion of the posterior wall of the ear canal as well as a very low tegmen with anterior  sigmoid that required a canal wall down mastoidectomy done in 2/2023. I did not reconstruct the ossicular chain either. The cavity has epithelized appropriately and it looks to be in perfect condition.    He had a bone conduction evaluation which showed that he is a good candidate for an OSIA on the right side. He feels that his hearing on the left side is improving. For that reason, he would like to hold on having the OSIA. I will plan to see him again in 6 months with an audiogram.       Scribe Attestation  By signing my name below, I, Nikos Bermudez, Kdaeibe, attest that this documentation has been prepared under the direction and in the presence of Yusuf Salgado MD.  ____________________________________________________  Yusuf Candelaria MD  Professor and Chief   Otology/Neurotology/Lateral Skull-Base Surgery   [facility]

## 2025-01-23 NOTE — PROGRESS NOTES
Reason for Consult:  Follow-up (Hearing test results)     Subjective   History Of Present Illness:  Ashkan Collado is a 24 y.o. male with bilateral cholesteatomas. They were both very extensive. On the left side, he had an attic cholesteatoma extending to the mastoid and extending laterally to the cortical bone of the mastoid. The incus was eroded and the stapes was intact. He is s/p left-sided tympanomastoidectomy with removal of cholesteatoma cartilage graft on 02/2023. We did not reconstruct the ossicular chain at that time.     I took him to the OR on 03/2023 and performed a right modified radical tympanomastoidectomy with meatoplasty and removal of the tip of the mastoid due to erosion of the posterior ear canal wall. Since surgery, he has been doing well without any problems.      I took him to the OR on 12/18/23 for a left second look tympanoplasty with ossicular chain reconstruction.     During surgery, we encountered:  1. Neotympanum well healed without retractions.   2. No residual cholesteatoma encountered. Hypotympanum, protympanum, mesotympanum, retrotympanum, epitympanum and mastoid explored and no disease observed.  3. Ossicular chain:   - Malleus: absent from previous surgery.Handle only in place  - Incus: Absent.  - Stapes: Suprastructure present. Footplate mobile.  4. Chorda absent  from previous surgery.   5. 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  6. Middle ear mucosa severely inflamed.   7. OCR: [employer] PORP 2.25 mm  8. Facial nerve: Not dehiscent in tympanic segment.  9. Eustachian tube: Open.     Since surgery, he has been doing well without any problems. On the left, he achieved closure of the air-bone gap within 10-20 db. On the right, he has expected severe conductive hearing loss within 40-60 air-bone gap for which he was offered hearing aids and OSIA. He did like the OSIA but is not interested in hearing rehabilitation for the right ear at this time.     "  Past Medical History:  He has a past medical history of ADHD (attention deficit hyperactivity disorder), Anxiety, HL (hearing loss), Obstructive sleep apnea (adult) (pediatric) (01/28/2017), and Perforation of right tympanic membrane (11/11/2023).    Surgical History:  He has a past surgical history that includes Tonsillectomy (02/02/2015); Other surgical history (01/28/2017); and Tympanoplasty (02/03/2023).     Social History:  He reports that he has been smoking pipe. He has never used smokeless tobacco. He reports that he does not currently use alcohol. Drug use questions deferred to the physician.    Family History:  family history includes Diabetes in an other family member; Hyperlipidemia in his mother's brother and another family member; Obesity in his father, mother's brother, and another family member; Sleep apnea in his father and another family member.     Medications:  Current Outpatient Medications   Medication Instructions    oxyCODONE-acetaminophen (Percocet) 5-325 mg tablet 1 tablet, oral, Every 6 hours PRN      Allergies:  Patient has no known allergies.    Review of Systems:   A comprehensive 10-point review of systems was obtained including constitutional, neurological, HEENT, pulmonary, cardiovascular, genito-urinary, and other pertinent systems and was negative except as noted in the HPI.     Objective   Physical Exam:  Last Recorded Vitals: Temperature 36.2 °C (97.1 °F), temperature source Temporal, height 1.93 m (6' 4\"), weight (!) 176 kg (387 lb).    On physical exam, the patient is a well-nourished, well-developed patient, in no acute distress, able to communicate without assistance in English language. Head and face is atraumatic and normocephalic. Salivary glands are intact. Facial strength is symmetrical bilaterally.       On ear examination:  Right ear: The patient's mastoid cavity was debrided. The cavity has healed. The neotympanum is intact.  BC>AC  Left ear: The patient has cerumen " impaction which was removed. The neotympanum is intact.  AC>BC  The Saez is right    On vestibular exam, the patient has no spontaneous nystagmus, no headshake nystagmus, no head-thrust nystagmus, and no nystagmus on hyperventilation or Valsalva maneuvers. Chris-Hallpike maneuver is negative bilaterally.       On neuro exam, the patient is alert and oriented x3, cranial nerves are grossly intact, cerebellar exam is normal.      The rest of the exam, including anterior rhinoscopy, oropharyngeal exam, neck exam, and cardiovascular exam, were normal including no palpable lymphadenopathies, thyroid in the midline position, normal pulses, and normal chest excursion.       Reviewed Results:  Audiology Testing:  I personally reviewed the audiogram from 02/2025 that showed:   Right ear: moderate conductive hearing loss with 40-60 db . Discrimination: 96%   Left ear: mild conductive hearing loss within 10 db of air-bone gap. Discrimination: 100%        I reviewed the patient's VIOLETA evaluation that shows:  Unaided he gets 32% on CNC and 81% on [employer] on the right   Using a BAHA on a soft band, he gets 62% on CNC and 99% on [employer] on the right       I personally reviewed the audiogram from 4/2024 that showed a moderate conductive hearing loss on the left side and a moderate severe conductive hearing loss on the right side with 40dB of air-borne gap. He has 100% discrimination bilaterally.       I reviewed his audiogram from 08/2023 that showed a mild left-sided conductive hearing loss and a moderate maximum conductive hearing loss on the right side. He has a 100% discrimination bilaterally.       I reviewed his audiogram from 01/2023 that showed a moderate conductive hearing loss on the left side and moderate to severe conductive hearing loss on the right side. He has 100% discrimination bilaterally.        Imaging:  I reviewed the patient's CT IAC from 7/2024 which showed:  Canal wall down cavity on the right side in good  condition without evidence of cholesteatoma. On the left side, the middle ear space is well ventilated with a PORP making good contact with the stapes and the neotympanum. There is no evidence of recurrent cholesteatoma.     I reviewed his CT scan from 12/2022 that showed a right-sided cholesteatoma with an erosion of the ear canal, absent incus and stapes. The lateral canal is intact. He has a very anterior sigmoid and a low line tegmen on the right side. On the left side, he has an epitympanic cholesteatoma with erosion of the incus and debris in the mastoid and attic. The cortex is eroded. The stapes seems to be present.      Procedure:  Right sided mastoid cavity debridement  Left sided cerumen impaction removal    Assessment/Plan     1. Cholesteatoma of both ears    2. Conductive hearing loss, bilateral    3. Discontinuity of ossicles of both ears    4. Encounter for debridement of right postmastoidectomy cavity    5. Mixed hearing loss, bilateral    6. Impacted cerumen of left ear        In summary, Ashkan Collado is a 24 y.o. male with bilateral cholesteatomas. They were both very extensive. On the left side, he had an attic cholesteatoma extending to the mastoid and extending laterally to the cortical bone of the mastoid. The incus was eroded and the stapes was intact. He is s/p left-sided tympanomastoidectomy and removal of the cholesteatoma in 02/2023. Subsequently we did his second look left tympanoplasty with OCR on 12/18/23.     On the left side, the neotympanum looks fantastic. We achieved closure of the air-borne gap within 15 dB.     On the right side, he had had a extensive cholesteatoma with erosion of the posterior wall of the ear canal as well as a very low tegmen with anterior sigmoid that required a canal wall down mastoidectomy done in 2/2023. I did not reconstruct the ossicular chain as he had complete facial nerve dehiscence covering the footplate. The cavity has epithelized appropriately  and it looks to be in perfect condition.    He had a bone conduction evaluation which showed that he is a good candidate for an OSIA on the right side. He feels that his hearing on the left side is good. For that reason, he is not ready for the OSIA at this time.     Follow-up in 1 year with an audiogram.     ____________________________________________________  Yusuf Candelaria MD  Professor and Chief   Otology/Neurotology/Lateral Skull-Base Surgery   [facility]    Scribe Attestation  By signing my name below, I, Nirmal Fuentes, Scribe   attest that this documentation has been prepared under the direction and in the presence of Yusuf Salgado MD.

## 2025-02-03 ENCOUNTER — APPOINTMENT (OUTPATIENT)
Dept: OTOLARYNGOLOGY | Facility: CLINIC | Age: 25
End: 2025-02-03
Payer: COMMERCIAL

## 2025-02-03 ENCOUNTER — CLINICAL SUPPORT (OUTPATIENT)
Dept: AUDIOLOGY | Facility: CLINIC | Age: 25
End: 2025-02-03
Payer: COMMERCIAL

## 2025-02-03 VITALS — BODY MASS INDEX: 38.36 KG/M2 | TEMPERATURE: 97.1 F | WEIGHT: 315 LBS | HEIGHT: 76 IN

## 2025-02-03 DIAGNOSIS — H90.0 CONDUCTIVE HEARING LOSS, BILATERAL: Primary | ICD-10-CM

## 2025-02-03 DIAGNOSIS — H95.191 ENCOUNTER FOR DEBRIDEMENT OF RIGHT POSTMASTOIDECTOMY CAVITY: ICD-10-CM

## 2025-02-03 DIAGNOSIS — H61.22 IMPACTED CERUMEN OF LEFT EAR: ICD-10-CM

## 2025-02-03 DIAGNOSIS — H71.93 CHOLESTEATOMA OF BOTH EARS: Primary | ICD-10-CM

## 2025-02-03 DIAGNOSIS — H74.23 DISCONTINUITY OF OSSICLES OF BOTH EARS: ICD-10-CM

## 2025-02-03 DIAGNOSIS — H71.93 CHOLESTEATOMA OF BOTH EARS: ICD-10-CM

## 2025-02-03 DIAGNOSIS — H90.6 MIXED HEARING LOSS, BILATERAL: ICD-10-CM

## 2025-02-03 DIAGNOSIS — H90.0 CONDUCTIVE HEARING LOSS, BILATERAL: ICD-10-CM

## 2025-02-03 PROCEDURE — 92557 COMPREHENSIVE HEARING TEST: CPT | Performed by: AUDIOLOGIST

## 2025-02-03 PROCEDURE — 69220 CLEAN OUT MASTOID CAVITY: CPT | Performed by: OTOLARYNGOLOGY

## 2025-02-03 PROCEDURE — 69210 REMOVE IMPACTED EAR WAX UNI: CPT | Performed by: OTOLARYNGOLOGY

## 2025-02-03 PROCEDURE — 3008F BODY MASS INDEX DOCD: CPT | Performed by: OTOLARYNGOLOGY

## 2025-02-03 PROCEDURE — 99213 OFFICE O/P EST LOW 20 MIN: CPT | Performed by: OTOLARYNGOLOGY

## 2025-02-03 PROCEDURE — 92567 TYMPANOMETRY: CPT | Performed by: AUDIOLOGIST

## 2025-02-03 ASSESSMENT — PATIENT HEALTH QUESTIONNAIRE - PHQ9
2. FEELING DOWN, DEPRESSED OR HOPELESS: NOT AT ALL
SUM OF ALL RESPONSES TO PHQ9 QUESTIONS 1 & 2: 0
1. LITTLE INTEREST OR PLEASURE IN DOING THINGS: NOT AT ALL

## 2025-02-03 ASSESSMENT — PAIN SCALES - GENERAL: PAINLEVEL_OUTOF10: 0-NO PAIN

## 2025-02-03 NOTE — PROGRESS NOTES
Name: Ashkan Collado  YOB: 2000  Age: 24 y.o.    Date of Evaluation:  2/3/2025      History:      Patient presents today for hearing test in conjunction with ENT visit.  History of bilateral cholesteatomas. Recent congestion with aural fullness, has mostly resolved.  Documented bilateral conductive/mixed hearing loss. Denies dizziness, ear pain.       Evaluation:    Otoscopy revealed non-occluding cerumen bilaterally.    Immittance testing revealed normal middle ear function left, abnormal middle ear function right.  Right ear: severe conductive hearing loss with excellent word discrimination (96%)  Left ear: mild to moderate conductive hearing loss with excellent word discrimination (100%)  Right ear essentially stable, left ear improved.      Treatment Plan:    - Follow up with Dr. Candelaria  - Amplification if patient perception changes; does not plan to move forward with bone conduction device at this time  - Retest hearing in conjunction with otologic management      [id number]    Fede Burciaga, CCC-A

## 2025-02-03 NOTE — LETTER
February 13, 2025     Adam Kan MD  [address]  [place] [facility] [address]    Patient: Ashkan Collado   YOB: 2000   Date of Visit: 2/3/2025       Dear Dr. Adam Kan MD:    Thank you for referring Ashkan Collado to me for evaluation. Below are my notes for this consultation.  If you have questions, please do not hesitate to call me. I look forward to following your patient along with you.       Sincerely,     Yusuf Salgado MD      CC: No Recipients  ______________________________________________________________________________________            Reason for Consult:  Follow-up (Hearing test results)     Subjective  History Of Present Illness:  Ashkan Collado is a 24 y.o. male with bilateral cholesteatomas. They were both very extensive. On the left side, he had an attic cholesteatoma extending to the mastoid and extending laterally to the cortical bone of the mastoid. The incus was eroded and the stapes was intact. He is s/p left-sided tympanomastoidectomy with removal of cholesteatoma cartilage graft on 02/2023. We did not reconstruct the ossicular chain at that time.     I took him to the OR on 03/2023 and performed a right modified radical tympanomastoidectomy with meatoplasty and removal of the tip of the mastoid due to erosion of the posterior ear canal wall. Since surgery, he has been doing well without any problems.      I took him to the OR on 12/18/23 for a left second look tympanoplasty with ossicular chain reconstruction.     During surgery, we encountered:  1. Neotympanum well healed without retractions.   2. No residual cholesteatoma encountered. Hypotympanum, protympanum, mesotympanum, retrotympanum, epitympanum and mastoid explored and no disease observed.  3. Ossicular chain:   - Malleus: absent from previous surgery.Handle only in place  - Incus: Absent.  - Stapes: Suprastructure present. Footplate mobile.  4. Chorda absent   from previous surgery.   5. 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  6. Middle ear mucosa severely inflamed.   7. OCR: [employer] PORP 2.25 mm  8. Facial nerve: Not dehiscent in tympanic segment.  9. Eustachian tube: Open.     Since surgery, he has been doing well without any problems. On the left, he achieved closure of the air-bone gap within 10-20 db. On the right, he has expected severe conductive hearing loss within 40-60 air-bone gap for which he was offered hearing aids and OSIA. He did like the OSIA but is not interested in hearing rehabilitation for the right ear at this time.      Past Medical History:  He has a past medical history of ADHD (attention deficit hyperactivity disorder), Anxiety, HL (hearing loss), Obstructive sleep apnea (adult) (pediatric) (01/28/2017), and Perforation of right tympanic membrane (11/11/2023).    Surgical History:  He has a past surgical history that includes Tonsillectomy (02/02/2015); Other surgical history (01/28/2017); and Tympanoplasty (02/03/2023).     Social History:  He reports that he has been smoking pipe. He has never used smokeless tobacco. He reports that he does not currently use alcohol. Drug use questions deferred to the physician.    Family History:  family history includes Diabetes in an other family member; Hyperlipidemia in his mother's brother and another family member; Obesity in his father, mother's brother, and another family member; Sleep apnea in his father and another family member.     Medications:  Current Outpatient Medications   Medication Instructions   • oxyCODONE-acetaminophen (Percocet) 5-325 mg tablet 1 tablet, oral, Every 6 hours PRN      Allergies:  Patient has no known allergies.    Review of Systems:   A comprehensive 10-point review of systems was obtained including constitutional, neurological, HEENT, pulmonary, cardiovascular, genito-urinary, and other pertinent systems and was negative except as noted in the  "HPI.     Objective  Physical Exam:  Last Recorded Vitals: Temperature 36.2 °C (97.1 °F), temperature source Temporal, height 1.93 m (6' 4\"), weight (!) 176 kg (387 lb).    On physical exam, the patient is a well-nourished, well-developed patient, in no acute distress, able to communicate without assistance in English language. Head and face is atraumatic and normocephalic. Salivary glands are intact. Facial strength is symmetrical bilaterally.       On ear examination:  Right ear: The patient's mastoid cavity was debrided. The cavity has healed. The neotympanum is intact.  BC>AC  Left ear: The patient has cerumen impaction which was removed. The neotympanum is intact.  AC>BC  The Saez is right    On vestibular exam, the patient has no spontaneous nystagmus, no headshake nystagmus, no head-thrust nystagmus, and no nystagmus on hyperventilation or Valsalva maneuvers. Chris-Hallpike maneuver is negative bilaterally.       On neuro exam, the patient is alert and oriented x3, cranial nerves are grossly intact, cerebellar exam is normal.      The rest of the exam, including anterior rhinoscopy, oropharyngeal exam, neck exam, and cardiovascular exam, were normal including no palpable lymphadenopathies, thyroid in the midline position, normal pulses, and normal chest excursion.       Reviewed Results:  Audiology Testing:  I personally reviewed the audiogram from 02/2025 that showed:   Right ear: moderate conductive hearing loss with 40-60 db . Discrimination: 96%   Left ear: mild conductive hearing loss within 10 db of air-bone gap. Discrimination: 100%        I reviewed the patient's VIOLETA evaluation that shows:  Unaided he gets 32% on CNC and 81% on [employer] on the right   Using a BAHA on a soft band, he gets 62% on CNC and 99% on [employer] on the right       I personally reviewed the audiogram from 4/2024 that showed a moderate conductive hearing loss on the left side and a moderate severe conductive hearing loss on the right side " with 40dB of air-borne gap. He has 100% discrimination bilaterally.       I reviewed his audiogram from 08/2023 that showed a mild left-sided conductive hearing loss and a moderate maximum conductive hearing loss on the right side. He has a 100% discrimination bilaterally.       I reviewed his audiogram from 01/2023 that showed a moderate conductive hearing loss on the left side and moderate to severe conductive hearing loss on the right side. He has 100% discrimination bilaterally.        Imaging:  I reviewed the patient's CT IAC from 7/2024 which showed:  Canal wall down cavity on the right side in good condition without evidence of cholesteatoma. On the left side, the middle ear space is well ventilated with a PORP making good contact with the stapes and the neotympanum. There is no evidence of recurrent cholesteatoma.     I reviewed his CT scan from 12/2022 that showed a right-sided cholesteatoma with an erosion of the ear canal, absent incus and stapes. The lateral canal is intact. He has a very anterior sigmoid and a low line tegmen on the right side. On the left side, he has an epitympanic cholesteatoma with erosion of the incus and debris in the mastoid and attic. The cortex is eroded. The stapes seems to be present.      Procedure:  Right sided mastoid cavity debridement  Left sided cerumen impaction removal    Assessment/Plan    1. Cholesteatoma of both ears    2. Conductive hearing loss, bilateral    3. Discontinuity of ossicles of both ears    4. Encounter for debridement of right postmastoidectomy cavity    5. Mixed hearing loss, bilateral    6. Impacted cerumen of left ear        In summary, Ashkan Collado is a 24 y.o. male with bilateral cholesteatomas. They were both very extensive. On the left side, he had an attic cholesteatoma extending to the mastoid and extending laterally to the cortical bone of the mastoid. The incus was eroded and the stapes was intact. He is s/p left-sided  tympanomastoidectomy and removal of the cholesteatoma in 02/2023. Subsequently we did his second look left tympanoplasty with OCR on 12/18/23.     On the left side, the neotympanum looks fantastic. We achieved closure of the air-borne gap within 15 dB.     On the right side, he had had a extensive cholesteatoma with erosion of the posterior wall of the ear canal as well as a very low tegmen with anterior sigmoid that required a canal wall down mastoidectomy done in 2/2023. I did not reconstruct the ossicular chain as he had complete facial nerve dehiscence covering the footplate. The cavity has epithelized appropriately and it looks to be in perfect condition.    He had a bone conduction evaluation which showed that he is a good candidate for an OSIA on the right side. He feels that his hearing on the left side is good. For that reason, he is not ready for the OSIA at this time.     Follow-up in 1 year with an audiogram.     ____________________________________________________  Yusuf Candelaria MD  Professor and Chief   Otology/Neurotology/Lateral Skull-Base Surgery   [facility]    Scribe Attestation  By signing my name below, I, Nirmal Fuentes, Scribe   attest that this documentation has been prepared under the direction and in the presence of Yusuf Salgado MD.

## 2026-02-02 ENCOUNTER — APPOINTMENT (OUTPATIENT)
Dept: OTOLARYNGOLOGY | Facility: CLINIC | Age: 26
End: 2026-02-02
Payer: COMMERCIAL

## 2026-02-26 ENCOUNTER — APPOINTMENT (OUTPATIENT)
Dept: AUDIOLOGY | Facility: CLINIC | Age: 26
End: 2026-02-26
Payer: COMMERCIAL

## (undated) DEVICE — BOWL, BASIN, 32 OZ, STERILE

## (undated) DEVICE — TUBING, SUCTION, OTOMED

## (undated) DEVICE — MANIFOLD, 4 PORT NEPTUNE STANDARD

## (undated) DEVICE — CUP, SOLUTION

## (undated) DEVICE — REST, HEAD, BAGEL, 9 IN

## (undated) DEVICE — COVER, CART, 45 X 27 X 48 IN, CLEAR

## (undated) DEVICE — PROTECTOR, NERVE, ULNAR, PINK

## (undated) DEVICE — Device

## (undated) DEVICE — SYRINGE, 1 CC, LUER LOCK

## (undated) DEVICE — DRAPE, SMARTDRAPE, FOR TIVATO MICROSCOPE

## (undated) DEVICE — BLADE, TYMPANOPLASTY, BEAVER, 60 DEG ANGLE, SHARP ALL AROUND, 2.5 MM, BEVEL DOWN

## (undated) DEVICE — DRAPE, SURGICAL, OTOLOGY GLASSCOCK

## (undated) DEVICE — CLEANER, WIPE, INSTRUMENT, 3.25 X 3.25 IN

## (undated) DEVICE — STOCKINETTE, IMPERVIOUS, 12 X 48 IN, STERILE